# Patient Record
Sex: MALE | Race: BLACK OR AFRICAN AMERICAN | NOT HISPANIC OR LATINO | URBAN - METROPOLITAN AREA
[De-identification: names, ages, dates, MRNs, and addresses within clinical notes are randomized per-mention and may not be internally consistent; named-entity substitution may affect disease eponyms.]

---
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
## 2024-12-16 ENCOUNTER — TELEMEDICINE (OUTPATIENT)
Dept: BARIATRICS | Facility: CLINIC | Age: 24
End: 2024-12-16
Payer: COMMERCIAL

## 2024-12-16 VITALS
BODY MASS INDEX: 38.36 KG/M2 | SYSTOLIC BLOOD PRESSURE: 128 MMHG | WEIGHT: 315 LBS | HEIGHT: 76 IN | OXYGEN SATURATION: 97 % | HEART RATE: 88 BPM | DIASTOLIC BLOOD PRESSURE: 84 MMHG

## 2024-12-16 DIAGNOSIS — E66.01 OBESITY, CLASS III, BMI 40-49.9 (MORBID OBESITY) (HCC): Primary | ICD-10-CM

## 2024-12-16 PROBLEM — E66.813 OBESITY, CLASS III, BMI 40-49.9 (MORBID OBESITY): Status: ACTIVE | Noted: 2024-12-16

## 2024-12-16 PROCEDURE — 99204 OFFICE O/P NEW MOD 45 MIN: CPT | Performed by: SURGERY

## 2024-12-16 NOTE — PROGRESS NOTES
Virtual Regular Visit  Name: Vimal Solorio      : 2000      MRN: [id number]  Encounter Provider: Hu Nava MD  Encounter Date: 2024   Encounter department: [facility] WEIGHT MANAGEMENT [facility]      Verification of patient location:  Patient is located at the office in the following state in which I hold an active license [place] :  Assessment & Plan  Obesity, Class III, BMI 40-49.9 (morbid obesity) (Formerly [facility])               Encounter provider Hu Nava MD    The patient was identified by name and date of birth. Vimal Solorio was informed that this is a telemedicine visit and that the visit is being conducted through the Epic Embedded platform. He agrees to proceed..  My office door was closed. No one else was in the room.  He acknowledged consent and understanding of privacy and security of the video platform. The patient has agreed to participate and understands they can discontinue the visit at any time.    Patient is aware this is a billable service.     History of Present Illness     HPI:  Vimal Solorio is a 24 y.o. male who presents with a longstanding history of morbid obesity and inability to sustain a meaningful weight loss.    Here today to discuss bariatric options.    Visit type: initial visit    Symptoms: excess weight and inability to loss weight    Associated Symptoms: depressed mood and anxiety    Associated Conditions: abdominal obesity  Disease Complications: none  Weight Loss Interest: high  Previous Diet Trials: low calorie     Exercise Frequency:infrequency  Types of Exercise: walking    Review of Systems    Historical Information   No past medical history on file.  No past surgical history on file.  Social History   Social History     Substance and Sexual Activity   Alcohol Use Not on file     Social History     Substance and Sexual Activity   Drug Use Not on file     Social History     Tobacco Use   Smoking Status Not on file   Smokeless Tobacco Not on file     Family History:  "Family history non-contributory    Meds/Allergies   all medications and allergies reviewed  Not on File    Objective     Current Vitals:   Blood Pressure: 128/84 (12/16/24 0944)  Pulse: 88 (12/16/24 0944)  Height: 6' 3.5\" (191.8 cm) (12/16/24 0944)  Weight - Scale: (!) 239 kg (526 lb) (12/16/24 0944)  SpO2: 97 % (12/16/24 0944)      Invasive Devices       None                   Physical Exam    Lab Results: I have personally reviewed pertinent lab results.    Imaging: Results Review Statement: No pertinent imaging studies reviewed.  EKG, Pathology, and Other Studies: Results Review Statement: No pertinent imaging studies reviewed.      Assessment/PLAN:    24 y.o. male with a long standing h/o of obesity and inability to sustain any meaningful weight loss on his own despite several attempts.    He is interested in the Laparoscopic Sleeve Gastrectomy.  This is the surgery that he is interested in.    As a part of his pre op evaluation, he will be referred to a cardiologist for risk stratification and for a sleep evaluation and consult to rule out obstructive sleep apnea if deemed necessary based on his score.    He needs an EGD to evaluate the anatomy of his GI tract.    I have spent over 30 minutes with him face to face in the office today discussing his options and details of the surgery. We have seen an animation of the surgery on the computer that illustrates how the operation is done and how the anatomy will be altered with the procedure. Over 50% of this was coordinating care.    I have used the MBSAQIP bariatric surgical risk/benefit calculator and we have discussed the results as part of the preop education.  I have discussed and educated the patient with regards to the components of our multidisciplinary program and the importance of compliance and follow up in the post operative period.    He was given the opportunity to ask questions and I have answered all of them.    The patient was also instructed with " "regards to the importance of behavior modification, nutritional counseling, support meeting attendance and lifestyle changes that are important to ensure success.    Although there is a great statistical chance of improvement or even resolution of most of his associated comorbidities, the results vary from patient to patient and they largely depend on his commitment and compliance.     I have reviewed instructions for stopping or tapering anti-obesity medications prior to surgery.      I have encouraged him to lose 40-50 lbs prior to the operation as he currently weighs 526 pounds with a BMI of 64.8.  At his current weight we are not going to be able to offer him any surgical options until we help him lose      Hu Nava MD  12/16/2024  9:47 AM    Objective   /84 (BP Location: Right arm, Patient Position: Sitting, Cuff Size: Large)   Pulse 88   Ht 6' 3.5\" (1.918 m)   Wt (!) 239 kg (526 lb)   SpO2 97%   BMI 64.88 kg/m²     Physical Exam (Visual assessment)    Patient was awake alert and oriented.  No distress.  Obese.      Visit Time  Total Visit Duration: 25  "

## 2024-12-16 NOTE — PROGRESS NOTES
"    BARIATRIC INITIAL CONSULT - BARIATRIC SURGERY    Vimal Solorio 24 y.o. male MRN: [id number]  Unit/Bed#:  Encounter: [id number]      HPI:  Vimal Solorio is a 24 y.o. male who presents with a longstanding history of morbid obesity and inability to sustain a meaningful weight loss.    Here today to discuss bariatric options.    Visit type: initial visit    Symptoms: excess weight and inability to loss weight    Associated Symptoms: depressed mood and anxiety    Associated Conditions: abdominal obesity  Disease Complications: none  Weight Loss Interest: high  Previous Diet Trials: low calorie     Exercise Frequency:infrequency  Types of Exercise: walking    Review of Systems    Historical Information   No past medical history on file.  No past surgical history on file.  Social History   Social History     Substance and Sexual Activity   Alcohol Use Not on file     Social History     Substance and Sexual Activity   Drug Use Not on file     Social History     Tobacco Use   Smoking Status Not on file   Smokeless Tobacco Not on file     Family History: Family history non-contributory    Meds/Allergies   all medications and allergies reviewed  Not on File    Objective     Current Vitals:   Blood Pressure: 128/84 (12/16/24 0944)  Pulse: 88 (12/16/24 0944)  Height: 6' 3.5\" (191.8 cm) (12/16/24 0944)  Weight - Scale: (!) 239 kg (526 lb) (12/16/24 0944)  SpO2: 97 % (12/16/24 0944)      Invasive Devices       None                   Physical Exam    Lab Results: I have personally reviewed pertinent lab results.    Imaging: Results Review Statement: No pertinent imaging studies reviewed.  EKG, Pathology, and Other Studies: Results Review Statement: No pertinent imaging studies reviewed.      Assessment/PLAN:    24 y.o. male with a long standing h/o of obesity and inability to sustain any meaningful weight loss on his own despite several attempts.    He is interested in the Laparoscopic sleeve gastrectomy.  This this operation that he " is interested in.    As a part of his pre op evaluation, he will be referred to a cardiologist for risk stratification and for a sleep evaluation and consult to rule out obstructive sleep apnea if deemed necessary based on his score.    He needs an EGD to evaluate the anatomy of his GI tract.    I have spent over 30 minutes with him face to face in the office today discussing his options and details of the surgery. We have seen an animation of the surgery on the computer that illustrates how the operation is done and how the anatomy will be altered with the procedure. Over 50% of this was coordinating care.    I have used the [facility] bariatric surgical risk/benefit calculator and we have discussed the results as part of the preop education.  I have discussed and educated the patient with regards to the components of our multidisciplinary program and the importance of compliance and follow up in the post operative period.    He was given the opportunity to ask questions and I have answered all of them.    The patient was also instructed with regards to the importance of behavior modification, nutritional counseling, support meeting attendance and lifestyle changes that are important to ensure success.    Although there is a great statistical chance of improvement or even resolution of most of his associated comorbidities, the results vary from patient to patient and they largely depend on his commitment and compliance.     I have reviewed instructions for stopping or tapering anti-obesity medications prior to surgery.      I have encouraged him to lose 40-50 lbs prior to the operation.      Hu Nava MD  12/16/2024  9:47 AM

## 2025-01-09 ENCOUNTER — CLINICAL SUPPORT (OUTPATIENT)
Dept: BARIATRICS | Facility: CLINIC | Age: 25
End: 2025-01-09

## 2025-01-09 VITALS — BODY MASS INDEX: 38.36 KG/M2 | WEIGHT: 315 LBS | HEIGHT: 76 IN

## 2025-01-09 DIAGNOSIS — E66.01 OBESITY, CLASS III, BMI 40-49.9 (MORBID OBESITY) (HCC): Primary | ICD-10-CM

## 2025-01-09 PROCEDURE — RECHECK

## 2025-01-13 ENCOUNTER — OFFICE VISIT (OUTPATIENT)
Dept: FAMILY MEDICINE CLINIC | Facility: CLINIC | Age: 25
End: 2025-01-13
Payer: COMMERCIAL

## 2025-01-13 VITALS
SYSTOLIC BLOOD PRESSURE: 138 MMHG | BODY MASS INDEX: 38.36 KG/M2 | HEIGHT: 76 IN | RESPIRATION RATE: 18 BRPM | WEIGHT: 315 LBS | TEMPERATURE: 96.7 F | DIASTOLIC BLOOD PRESSURE: 82 MMHG | HEART RATE: 84 BPM

## 2025-01-13 DIAGNOSIS — Z00.00 ROUTINE ADULT HEALTH MAINTENANCE: Primary | ICD-10-CM

## 2025-01-13 DIAGNOSIS — Z11.59 NEED FOR HEPATITIS C SCREENING TEST: ICD-10-CM

## 2025-01-13 DIAGNOSIS — Z23 NEED FOR VACCINATION: ICD-10-CM

## 2025-01-13 DIAGNOSIS — Z13.6 SCREENING FOR CARDIOVASCULAR CONDITION: ICD-10-CM

## 2025-01-13 DIAGNOSIS — Z13.0 SCREENING FOR DEFICIENCY ANEMIA: ICD-10-CM

## 2025-01-13 DIAGNOSIS — Z13.1 SCREENING FOR DIABETES MELLITUS: ICD-10-CM

## 2025-01-13 DIAGNOSIS — Z13.29 SCREENING FOR THYROID DISORDER: ICD-10-CM

## 2025-01-13 PROCEDURE — 99385 PREV VISIT NEW AGE 18-39: CPT | Performed by: NURSE PRACTITIONER

## 2025-01-13 PROCEDURE — 90471 IMMUNIZATION ADMIN: CPT

## 2025-01-13 PROCEDURE — 90715 TDAP VACCINE 7 YRS/> IM: CPT

## 2025-01-13 NOTE — PATIENT INSTRUCTIONS
"Patient Education     Routine physical for adults   The Basics   Written by the doctors and editors at [facility]   What is a physical? -- A physical is a routine visit, or \"check-up,\" with your doctor. You might also hear it called a \"wellness visit\" or \"preventive visit.\"  During each visit, the doctor will:   Ask about your physical and mental health   Ask about your habits, behaviors, and lifestyle   Do an exam   Give you vaccines if needed   Talk to you about any medicines you take   Give advice about your health   Answer your questions  Getting regular check-ups is an important part of taking care of your health. It can help your doctor find and treat any problems you have. But it's also important for preventing health problems.  A routine physical is different from a \"sick visit.\" A sick visit is when you see a doctor because of a health concern or problem. Since physicals are scheduled ahead of time, you can think about what you want to ask the doctor.  How often should I get a physical? -- It depends on your age and health. In general, for people age 21 years and older:   If you are younger than 50 years, you might be able to get a physical every 3 years.   If you are 50 years or older, your doctor might recommend a physical every year.  If you have an ongoing health condition, like diabetes or high blood pressure, your doctor will probably want to see you more often.  What happens during a physical? -- In general, each visit will include:   Physical exam - The doctor or nurse will check your height, weight, heart rate, and blood pressure. They will also look at your eyes and ears. They will ask about how you are feeling and whether you have any symptoms that bother you.   Medicines - It's a good idea to bring a list of all the medicines you take to each doctor visit. Your doctor will talk to you about your medicines and answer any questions. Tell them if you are having any side effects that bother you. You " "should also tell them if you are having trouble paying for any of your medicines.   Habits and behaviors - This includes:   Your diet   Your exercise habits   Whether you smoke, drink alcohol, or use drugs   Whether you are sexually active   Whether you feel safe at home  Your doctor will talk to you about things you can do to improve your health and lower your risk of health problems. They will also offer help and support. For example, if you want to quit smoking, they can give you advice and might prescribe medicines. If you want to improve your diet or get more physical activity, they can help you with this, too.   Lab tests, if needed - The tests you get will depend on your age and situation. For example, your doctor might want to check your:   Cholesterol   Blood sugar   Iron level   Vaccines - The recommended vaccines will depend on your age, health, and what vaccines you already had. Vaccines are very important because they can prevent certain serious or deadly infections.   Discussion of screening - \"Screening\" means checking for diseases or other health problems before they cause symptoms. Your doctor can recommend screening based on your age, risk, and preferences. This might include tests to check for:   Cancer, such as breast, prostate, cervical, ovarian, colorectal, prostate, lung, or skin cancer   Sexually transmitted infections, such as chlamydia and gonorrhea   Mental health conditions like depression and anxiety  Your doctor will talk to you about the different types of screening tests. They can help you decide which screenings to have. They can also explain what the results might mean.   Answering questions - The physical is a good time to ask the doctor or nurse questions about your health. If needed, they can refer you to other doctors or specialists, too.  Adults older than 65 years often need other care, too. As you get older, your doctor will talk to you about:   How to prevent falling at " home   Hearing or vision tests   Memory testing   How to take your medicines safely   Making sure that you have the help and support you need at home  All topics are updated as new evidence becomes available and our peer review process is complete.  This topic retrieved from [employer] on: May 02, 2024.  Topic 502930 Version 1.0  Release: 32.4.3 - C32.122  © 2024 UpToDate, Inc. and/or its affiliates. All rights reserved.  Consumer Information Use and Disclaimer   Disclaimer: This generalized information is a limited summary of diagnosis, treatment, and/or medication information. It is not meant to be comprehensive and should be used as a tool to help the user understand and/or assess potential diagnostic and treatment options. It does NOT include all information about conditions, treatments, medications, side effects, or risks that may apply to a specific patient. It is not intended to be medical advice or a substitute for the medical advice, diagnosis, or treatment of a health care provider based on the health care provider's examination and assessment of a patient's specific and unique circumstances. Patients must speak with a health care provider for complete information about their health, medical questions, and treatment options, including any risks or benefits regarding use of medications. This information does not endorse any treatments or medications as safe, effective, or approved for treating a specific patient. UpToDate, Inc. and its affiliates disclaim any warranty or liability relating to this information or the use thereof.The use of this information is governed by the Terms of Use, available at https://www.woltersSendGriduwer.com/en/know/clinical-effectiveness-terms. 2024© UpToDate, Inc. and its affiliates and/or licensors. All rights reserved.  Copyright   © 2024 UpToDate, Inc. and/or its affiliates. All rights reserved.

## 2025-01-13 NOTE — PROGRESS NOTES
Adult Annual Physical  Name: Vimal Solorio      : 2000      MRN: [id number]  Encounter Provider: BRENDA Sena  Encounter Date: 2025   Encounter department: [facility]    Assessment & Plan  Routine adult health maintenance         Screening for diabetes mellitus    Orders:  •  Comprehensive metabolic panel; Future    Screening for deficiency anemia    Orders:  •  CBC; Future    Screening for cardiovascular condition    Orders:  •  Lipid Panel with Direct LDL reflex; Future    Need for hepatitis C screening test    Orders:  •  Hepatitis C antibody; Future    Screening for thyroid disorder    Orders:  •  TSH, 3rd generation with Free T4 reflex; Future    Need for vaccination    Orders:  •  TDAP VACCINE GREATER THAN OR EQUAL TO 8YO IM    Immunizations and preventive care screenings were discussed with patient today. Appropriate education was printed on patient's after visit summary.    Counseling:  Alcohol/drug use: discussed moderation in alcohol intake, the recommendations for healthy alcohol use, and avoidance of illicit drug use.  Dental Health: discussed importance of regular tooth brushing, flossing, and dental visits.  Injury prevention: discussed safety/seat belts, safety helmets, smoke detectors, carbon monoxide detectors, and smoking near bedding or upholstery.  Sexual health: discussed sexually transmitted diseases, partner selection, use of condoms, avoidance of unintended pregnancy, and contraceptive alternatives.  Exercise: the importance of regular exercise/physical activity was discussed. Recommend exercise 3-5 times per week for at least 30 minutes.          History of Present Illness     Adult Annual Physical:  Patient presents for annual physical. New to practice.  Personal and family medical history reviewed  Patient stated that seeing weight management as trying to do gastric sleeve for weight loss  Denies any concerns and complains  .     Diet and Physical Activity:  -  "Diet/Nutrition:. trying to eat healthy  - Exercise: 1-2 times a week on average.    Depression Screening:  - PHQ-2 Score: 0    General Health:  - Sleep: sleeps well.  - Hearing: normal hearing bilateral ears.  - Vision:. recommended to see opthalmology  - Dental: no dental visits for > 1 year.     Health:    - Urinary symptoms: none.     Review of Systems   Constitutional: Negative.    HENT: Negative.     Eyes: Negative.    Respiratory: Negative.     Cardiovascular: Negative.    Gastrointestinal: Negative.    Endocrine: Negative.    Genitourinary: Negative.    Musculoskeletal: Negative.    Skin: Negative.    Allergic/Immunologic: Negative.    Neurological: Negative.    Hematological: Negative.    Psychiatric/Behavioral: Negative.           Objective   /82   Pulse 84   Temp (!) 96.7 °F (35.9 °C)   Resp 18   Ht 6' 3.5\" (1.918 m)   Wt (!) 233 kg (513 lb 3.2 oz)   BMI 63.30 kg/m²   Vision Screening    Right eye Left eye Both eyes   Without correction 20/25 20/25 20/25   With correction         Physical Exam  Vitals and nursing note reviewed.   Constitutional:       Appearance: He is morbidly obese.   HENT:      Head: Normocephalic and atraumatic.      Salivary Glands: Right salivary gland is not diffusely enlarged or tender. Left salivary gland is not diffusely enlarged or tender.      Right Ear: Tympanic membrane, ear canal and external ear normal.      Left Ear: Tympanic membrane, ear canal and external ear normal.      Nose: Nose normal. No nasal tenderness or mucosal edema.      Right Sinus: No maxillary sinus tenderness or frontal sinus tenderness.      Left Sinus: No maxillary sinus tenderness or frontal sinus tenderness.      Mouth/Throat:      Lips: Pink.      Mouth: Mucous membranes are moist.      Dentition: Normal dentition.      Tongue: No lesions.      Pharynx: No pharyngeal swelling, oropharyngeal exudate, posterior oropharyngeal erythema or uvula swelling.   Eyes:      General: Lids are " normal.         Right eye: No hordeolum.         Left eye: No hordeolum.      Conjunctiva/sclera: Conjunctivae normal.   Neck:      Thyroid: No thyromegaly or thyroid tenderness.   Cardiovascular:      Rate and Rhythm: Normal rate and regular rhythm.      Pulses: Normal pulses.      Heart sounds: No murmur heard.  Pulmonary:      Effort: Pulmonary effort is normal. No respiratory distress.      Breath sounds: Normal breath sounds.   Chest:      Chest wall: No mass, lacerations, deformity, swelling, tenderness, crepitus or edema. There is no dullness to percussion.   Breasts:     Right: No swelling, bleeding, inverted nipple, mass, nipple discharge, skin change or tenderness.      Left: No swelling, bleeding, inverted nipple, mass, nipple discharge, skin change or tenderness.   Abdominal:      General: Abdomen is flat.      Palpations: Abdomen is soft.      Tenderness: There is no abdominal tenderness.      Hernia: No hernia is present. There is no hernia in the umbilical area or ventral area.   Musculoskeletal:         General: Normal range of motion.      Cervical back: Full passive range of motion without pain and neck supple.      Right lower leg: No edema.      Left lower leg: No edema.   Lymphadenopathy:      Cervical:      Right cervical: No superficial or posterior cervical adenopathy.     Left cervical: No superficial or posterior cervical adenopathy.      Upper Body:      Right upper body: No supraclavicular or axillary adenopathy.      Left upper body: No supraclavicular or axillary adenopathy.      Lower Body: No right inguinal adenopathy. No left inguinal adenopathy.   Skin:     General: Skin is warm and dry.   Neurological:      General: No focal deficit present.      Mental Status: He is alert.      GCS: GCS eye subscore is 4. GCS verbal subscore is 5. GCS motor subscore is 6.      Sensory: Sensation is intact.      Motor: Motor function is intact.      Coordination: Coordination is intact.      Gait:  Gait is intact.      Deep Tendon Reflexes: Reflexes are normal and symmetric.

## 2025-01-15 ENCOUNTER — CLINICAL SUPPORT (OUTPATIENT)
Dept: BARIATRICS | Facility: CLINIC | Age: 25
End: 2025-01-15

## 2025-01-15 VITALS — WEIGHT: 315 LBS | BODY MASS INDEX: 62.78 KG/M2

## 2025-01-15 VITALS — HEIGHT: 76 IN | BODY MASS INDEX: 38.36 KG/M2 | WEIGHT: 315 LBS

## 2025-01-15 DIAGNOSIS — E66.01 MORBID (SEVERE) OBESITY DUE TO EXCESS CALORIES (HCC): ICD-10-CM

## 2025-01-15 DIAGNOSIS — E66.01 OBESITY, CLASS III, BMI 40-49.9 (MORBID OBESITY) (HCC): Primary | ICD-10-CM

## 2025-01-15 DIAGNOSIS — Z01.818 PRE-OP TESTING: Primary | ICD-10-CM

## 2025-01-15 PROCEDURE — RECHECK

## 2025-01-15 NOTE — PROGRESS NOTES
Bariatric Behavioral Health Evaluation    Presenting Problem:  Vimal Solorio  is a 24 y.o.   male   :  2000   Patient is pursuing surgery to improve his health, to have long term health. He is hoping the surgery will promote improved dietary intake, to be more active and to reduce physical limitations. Patient has tried increasing exercise, be conscientious of food choices, restrict meals to once a day.     Is the patient seeking Bariatric Surgery Eval?  Yes   If yes, how long has patient been considering, researching and/or making changes for surgery? Patient has been considering the surgery about a year, his girlfriend has had the surgery and spoke with her about her experience. He feels he's getting older so now is the time to pursure surgery.     Realizes Post-Op Requirements? Yes      Pre-morbid level of function and history of present illness: No chronic health conditions or pain, some mobility limitations and shortness of breath.     Living situation: Patient lives with mother. Good living environment, no conflict or tension, good support. He's close with his brothers and father, good family support.    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 Patient works  through  11pm to 7am. He likes his work, he's been there for four years, no real stress, good support with coworkers.     Physical Activity: Patient was going for walks during warmer days but with the cold that has stopped. Most of his activity is with work, he plans to incorporate weight lifting at home.    Family History (medical, traditions, culture, rules/routines around food):   Obesity/Overweight: brother  Mental Health Diagnosis: none  Substance Use Disorder/Alcoholism: none  Tobacco use: father  Family Cultural/Traditions: Growing up he was able to choose what he wanted to eat, he always enjoyed his  mother's cooking. Food was plated for him, was able to get seconds during meals, had to ask permission for snacks. No food insecurity.    Mental Health, Trauma and Substance use Assessment    Psychiatric/Psychological Treatment Diagnosis, History of Eating Disorders: Patient denies any mental health or eating disorder diagnosis     Outpatient Counselor No      Psychiatrist No     Have you had any Mental Health or Substance Use Inpatient Treatment? No    Drug and/or Alcohol use and treatment history: Patient denies any substance use disorder, he drinks alcohol on social occasions and holidays, up to 1 beer at an event.     Tobacco/Vaping History: he is a non-smoker and doesn't vape    Domestic Violence: No      Abuse or Trauma History: None      Risk Assessment    Stressors and Supports: Patient struggles with weight and the stress it puts on their health, no other life stressors. Patient's parents, girlfriend, and brothers know he's seeking surgery and they are supportive.    Risk of harm to self or others: Patient denies any SI/HI.    Presence of Audio/Visual Hallucinations:  No audio or visual hallucinations reported.    Access to weapons: none     Observation: this interview    Based on the previous information, the client presents the following risk of harm to self or others: low      Physical/Mental Health Status:     Appearance: appropriate  Sociability: friendly  Affect: appropriate  Mood: calm  Thought Process: coherent  Speech: normal  Content: no impairment  Orientation: person  Yes , place  Yes , time  Yes , normal attention span  Yes , normal memory  Yes  , decreased in concentration ability  No, and normal judgement  Yes   Insight: emotional  good       BARIATRIC SURGERY EDUCATION CHECKLIST    Patient has received the following education related to the bariatric surgery process and understands:    Patients may be required to complete a psychiatric evaluation and receive clearance for surgery from [facility]  health provider.    Patients who undergo weight loss surgery are at higher risk of increased mental health concerns and suicide attempts.    Patients may be required to complete a full substance abuse evaluation and then complete all treatment recommendations prior to surgery.    If diagnosis of abuse/dependence results, patient may be required to remain sober for one (1) year before having bariatric surgery.    Patients on psychiatric medications should check with their provider to discuss psychiatric medications and the changes in absorption.  Patient should discuss all time release medications with provider and take all medications as prescribed.    The recommendation is that there is no use of any tobacco products, Hookah or vapes for the bariatric post-operation patient.    Bariatric surgery patients should not consume alcohol as a post-operative patient as it may increase risk of numerous health conditions including but not limited to alcohol abuse and ulcers.    There is a possibility of weight regain if patient does not follow all program guidelines and recommendations.    Bariatric surgery patients should exercise thirty (30) to sixty (60) minutes per day to maintain post-surgical weight loss.    Research indicates that bariatric patients are more successful when they see a therapist for up to two (2) years post-op.    Patients will follow all medical and dietary recommendations provided.    Patient will keep all scheduled appointments and follow up with their physician for a minimum of five (5) years.    Patient will take all vitamins as recommended.  Post-operative vitamins are life-long.    There is a goal month set.  All requirements should be met by this time. Don't wait to get started!    There is a deadline month set.  All requirements must be finished by this time and if not, the patient will be halted in the surgery process. The patient can be referred to the medical weight management program or  can come back to the surgical program once the unfinished tasks from the previous program are completed.      Female patients of childbearing years are informed that pregnancy is not recommended until 12 months post-op.      Recommendations: Recommended for surgery  yes and Patient meets the criteria to be a member of [facility] Bariatric surgery program.      Note:  Patient denies any mental health diagnosis or eating disorder, she denies any substance use disorders or smoking/vaping of any kind. He drinks alcohol on social occasions, 1 beer during an event. Risk of alcohol, tobacco use, smoking or vaping of any kind post op were discussed, recommendation to abstain from all substance use was explained. Patient works non-traditional hours so sleeping and eating routine may be off. He used to snack while playing video games, skip meals. He is now trying to spread out his calories throughout the day, getting about six hours of sleep, no snacking, drinking more water. May rely on quick carbs like a bowl of cereal as a meal. Encouraged patient to be mindful of delaying meals, spreading calories out throughout his waking hours as he may not eat at all while at work, tune in to any snacking. No contraindications for surgery are identified at this time, patient is aware of post-op risks, it is recommended for patient to progress through surgical process.  Catie Rae LCSW, Bariatric

## 2025-01-15 NOTE — PROGRESS NOTES
"Bariatric Nutrition Assessment Note    Type of surgery    Preop (12 wt check)  Surgery Date: TBD--pt is leaning more toward the RYGB  Surgeon: Daryl Surgeons: Dr. Nava     Nutrition Assessment   Vimal Solorio  24 y.o.  male     Wt with BMI of 25: 203.2#  Pre-Op Excess Wt: 323#  Ht 6' 3.5\" (1.918 m)   Wt (!) 231 kg (509 lb)   BMI 62.78 kg/m²     Start weight: 526# (1216/24)    [place] Jeor Equation:    [id number]  Weight Maintenance 4083  Estimated calories for weight loss 2600-4483 ( 1-2# per wk wt loss - sedentary )  Estimated protein needs 92-138g (1.0-1.5 gms/kg IBW )   Estimated fluid needs 3220 ml(30-35 ml/kg IBW )      Weight History   Onset of Obesity: Childhood--when was in HS and wasn't planing sports and now poor eating schedule  Family history of obesity: No  Wt Loss Attempts: Exercise  Self Created Diets (Portion Control, Healthy Food Choices, etc.)  Juicing  Patient has tried the above for 6 months or more with insufficient weight loss or weight regain, which is why patient has requested to be evaluated for weight loss surgery today  Maximum Wt Lost: -20#      Review of History and Medications   Past Medical History:   Diagnosis Date    No pertinent past medical history      Past Surgical History:   Procedure Laterality Date    WISDOM TOOTH EXTRACTION Bilateral     [id number]     Social History     Socioeconomic History    Marital status: Single     Spouse name: Not on file    Number of children: Not on file    Years of education: Not on file    Highest education level: Not on file   Occupational History    Not on file   Tobacco Use    Smoking status: Never    Smokeless tobacco: Never   Vaping Use    Vaping status: Never Used   Substance and Sexual Activity    Alcohol use: Not Currently    Drug use: Not on file    Sexual activity: Not on file   Other Topics Concern    Not on file   Social History Narrative    Not on file     Social Drivers of Health     Financial Resource Strain: Not on file "   Food Insecurity: Not on file   Transportation Needs: Not on file   Physical Activity: Not on file   Stress: Not on file   Social Connections: Not on file   Intimate Partner Violence: Not on file   Housing Stability: Not on file     No current outpatient medications on file.    Food Intake and Lifestyle Assessment   Food Intake Assessment completed via usual diet recall  Works 11p to 7am  Breakfast: 7:30-8am: bowl of cereal (cheerios) 1.5-2 cups with almond milk  Plays video games/watch movie  Sleeps from 6pm until about 10pm  3pm: smoothie--strawberries, banana, spinach w/water  7:30-8pm--will wake up to eat then back to sleep:  pan cooked 6oz chicken, a lot green beans and 3/4 cup rice. Before made changes was doing more pasta, larger portions, pizza  Not eating work overnight.    Beverage intake: water, juice, regular soda, and alcohol  Protein supplement: not at this time  Estimated protein intake per day: <75gm  Estimated fluid intake per day: 80oz  Meals eaten away from home: no more take out, a lot more home cooked  Typical meal pattern: 2 meals per day and little snacks per day  Eating Behaviors: Consumption of high calorie/ high fat foods, Consumption of high calorie beverages, and Large portion sizes  Food allergies or intolerances: No Known Allergies  Cultural or Christian considerations: -    Physical Assessment  Physical Activity  Types of exercise: more walking during the summer, wants to get back to the gym  Current physical limitations: -    Psychosocial Assessment   Support systems: significant other friend(s) relative(s)--girlfriend had sleeve sx  Socioeconomic factors: mom does most of the cooking and food shopping, but eats at girlfriend's more often who had sx.     Nutrition Diagnosis  Diagnosis: Overweight / Obesity (NC-3.3)  Related to: Physical inactivity and Excessive energy intake  As Evidenced by: BMI >25     Nutrition Prescription: Recommend the following diet  Regular    Interventions  "and Teaching   Discussed pre-op and post-op nutrition guidelines.       Patient educated and handouts provided.  Surgical changes to stomach / GI  Capacity of post-surgery stomach  Diet progression  Adequate hydration  Sugar and fat restriction to decrease \"dumping syndrome\"  Fat restriction to decrease steatorrhea  Expected weight loss  Weight loss plateaus/ possibility of weight regain  Exercise  Suggestions for pre-op diet  Nutrition considerations after surgery  Protein supplements  Meal planning and preparation  Appropriate carbohydrate, protein, and fat intake, and food/fluid choices to maximize safe weight loss, nutrient intake, and tolerance   Dietary and lifestyle changes  Possible problems with poor eating habits  Intuitive eating  Techniques for self monitoring and keeping daily food journal  Potential for food intolerance after surgery, and ways to deal with them including: lactose intolerance, nausea, reflux, vomiting, diarrhea, food intolerance, appetite changes, gas  Vitamin / Mineral supplementation of Multivitamin with minerals and Vitamin D      Education provided to: patient    Barriers to learning: No barriers identified    Readiness to change: preparation    Prior research on procedure: books, internet, discussed with provider, and friends or family    Comprehension: verbalizes understanding     Expected Compliance: good    Recommendations  Pt is an appropriate candidate for surgery. Yes    Evaluation / Monitoring  Dietitian to Monitor: Eating pattern as discussed Tolerance of nutrition prescription Body weight Lab values Physical activity    Pre-op weight goals:  Do NOT Gain  Can go to BMI of 40:   325#  Encouraged weight loss w/ diet and lifestyle changes  Will be started on a 2 week liver shrinking diet, possibly shorter/longer as per the discretion of the surgeon/team, directly prior to surgery    Workflow: (Incomplete in Bold):  Psych and/or D+A Clearance: n/a  Blood Work: has blood work " ordered from PCP, added A1c and advised to complete soon  PCP letter: completed  Surgeon Appt: completed--nan on 12/16/24  EGD: date TBD  Cardiac Risk Assessment with ECG: scheduled on 2/19/25  Sleep Studies: scheduled on 3/24/25  Nicotine test: n/a  Pre-Operative Program: 12 wt checks--all scheduled    Goals  Eliminate sugar sweetened beverages  64oz + calorie free fluids  Food journal via baritastic  Exercise 30 minutes 5 times per week--wants to get into the gym  Complete lesson plans 1-6  Eat 3 meals per day with protein at each meal  Use protein shake as meal replacement or snack as needed  Eliminate mindless snacking    Time Spent:   1 Hour

## 2025-01-21 ENCOUNTER — TELEPHONE (OUTPATIENT)
Dept: BARIATRICS | Facility: CLINIC | Age: 25
End: 2025-01-21

## 2025-01-23 ENCOUNTER — CLINICAL SUPPORT (OUTPATIENT)
Dept: BARIATRICS | Facility: CLINIC | Age: 25
End: 2025-01-23

## 2025-01-23 VITALS — BODY MASS INDEX: 62.29 KG/M2 | WEIGHT: 315 LBS

## 2025-01-23 DIAGNOSIS — E66.01 MORBID (SEVERE) OBESITY DUE TO EXCESS CALORIES (HCC): Primary | ICD-10-CM

## 2025-01-23 PROCEDURE — RECHECK

## 2025-01-23 NOTE — PROGRESS NOTES
Patient presents for 2 of 12 weight check, current weight 505lbs.    Eating behaviors/food choices: Patient reports increasing meals and water intake. He is focusing on three meals a day, taking a protein shake with him to work, snacking on fruit or other proteins if he is feeling hungry. He is trying to avoid tempting foods, mom made homemade ice cream that he did enjoy some of but left the room when he became to tempted. Discussed the balance of healthy foods with those occasional treats, being mindful of what he's choosing and why, eating mindfully. Patient is doing well with his water intake that is keeping him full, he reports improved energy with hydration, going to try different water flavorings and fruit infusion.    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 He plans to start going to the gym with a friend, he is looking forward to that interaction.    Sleep/Rest:  Patient reports he is doing better with sleep, instead of playing video games when he gets home from work he's going right to sleep. He's getting about 6hrs of sleep, reviewed the benefit of sleep on weight and health.      Mental Health/Wellness:  Patient is feeling good about the changes he's made and the weight loss he's attaining. He feels the changes are ones that he can continue, getting in water all the time is a hard change but he is hopeful to keep it going with water flavorings. He denies any mood changes or stressors, he is hopeful for continued weight management success.    Workflow review:    Labs and PCP: planning to go for labs on 1/25, needs PCP letter  Psych and EGD: no eval needed, schedule EGD  Nicotine: NA  Cardiology: scheduled 2/19  Sleep: scheduled 3/24  Weight Checks: 12    Goals:    - continue focus on protein rich foods and getting hydration  - continue to get adequate rest, incorporate activity as tolerated  - be mindful of food choices and  triggers of eating    Next Appointment:  weight check 3 of 12 with RD on 1/27

## 2025-01-25 LAB
ALBUMIN SERPL-MCNC: 4.5 G/DL (ref 4.3–5.2)
ALP SERPL-CCNC: 121 IU/L (ref 44–121)
ALT SERPL-CCNC: 32 IU/L (ref 0–44)
AST SERPL-CCNC: 24 IU/L (ref 0–40)
BASOPHILS # BLD AUTO: 0 X10E3/UL (ref 0–0.2)
BASOPHILS NFR BLD AUTO: 1 %
BILIRUB SERPL-MCNC: 0.3 MG/DL (ref 0–1.2)
BUN SERPL-MCNC: 11 MG/DL (ref 6–20)
BUN/CREAT SERPL: 11 (ref 9–20)
CALCIUM SERPL-MCNC: 9.7 MG/DL (ref 8.7–10.2)
CHLORIDE SERPL-SCNC: 104 MMOL/L (ref 96–106)
CHOLEST SERPL-MCNC: 146 MG/DL (ref 100–199)
CO2 SERPL-SCNC: 20 MMOL/L (ref 20–29)
CREAT SERPL-MCNC: 1.01 MG/DL (ref 0.76–1.27)
EGFR: 108 ML/MIN/1.73
EOSINOPHIL # BLD AUTO: 0 X10E3/UL (ref 0–0.4)
EOSINOPHIL NFR BLD AUTO: 1 %
ERYTHROCYTE [DISTWIDTH] IN BLOOD BY AUTOMATED COUNT: 13.9 % (ref 11.6–15.4)
GLOBULIN SER-MCNC: 3.3 G/DL (ref 1.5–4.5)
GLUCOSE SERPL-MCNC: 101 MG/DL (ref 70–99)
HBA1C MFR BLD: 6.1 % (ref 4.8–5.6)
HCT VFR BLD AUTO: 46.1 % (ref 37.5–51)
HCV AB S/CO SERPL IA: NON REACTIVE
HDLC SERPL-MCNC: 42 MG/DL
HGB BLD-MCNC: 15.1 G/DL (ref 13–17.7)
IMM GRANULOCYTES # BLD: 0 X10E3/UL (ref 0–0.1)
IMM GRANULOCYTES NFR BLD: 0 %
LDLC SERPL CALC-MCNC: 87 MG/DL (ref 0–99)
LDLC/HDLC SERPL: 2.1 RATIO (ref 0–3.6)
LYMPHOCYTES # BLD AUTO: 1.4 X10E3/UL (ref 0.7–3.1)
LYMPHOCYTES NFR BLD AUTO: 29 %
MCH RBC QN AUTO: 26.4 PG (ref 26.6–33)
MCHC RBC AUTO-ENTMCNC: 32.8 G/DL (ref 31.5–35.7)
MCV RBC AUTO: 81 FL (ref 79–97)
MICRODELETION SYND BLD/T FISH: NORMAL
MONOCYTES # BLD AUTO: 0.3 X10E3/UL (ref 0.1–0.9)
MONOCYTES NFR BLD AUTO: 5 %
NEUTROPHILS # BLD AUTO: 3.2 X10E3/UL (ref 1.4–7)
NEUTROPHILS NFR BLD AUTO: 64 %
PLATELET # BLD AUTO: 355 X10E3/UL (ref 150–450)
POTASSIUM SERPL-SCNC: 4.4 MMOL/L (ref 3.5–5.2)
PROT SERPL-MCNC: 7.8 G/DL (ref 6–8.5)
RBC # BLD AUTO: 5.71 X10E6/UL (ref 4.14–5.8)
SL AMB VLDL CHOLESTEROL CALC: 17 MG/DL (ref 5–40)
SODIUM SERPL-SCNC: 140 MMOL/L (ref 134–144)
TRIGL SERPL-MCNC: 89 MG/DL (ref 0–149)
TSH SERPL DL<=0.005 MIU/L-ACNC: 1.37 UIU/ML (ref 0.45–4.5)
WBC # BLD AUTO: 5 X10E3/UL (ref 3.4–10.8)

## 2025-01-27 ENCOUNTER — RESULTS FOLLOW-UP (OUTPATIENT)
Dept: FAMILY MEDICINE CLINIC | Facility: CLINIC | Age: 25
End: 2025-01-27

## 2025-01-27 ENCOUNTER — CLINICAL SUPPORT (OUTPATIENT)
Dept: BARIATRICS | Facility: CLINIC | Age: 25
End: 2025-01-27

## 2025-01-27 VITALS — HEIGHT: 76 IN | BODY MASS INDEX: 38.36 KG/M2 | WEIGHT: 315 LBS

## 2025-01-27 DIAGNOSIS — E66.01 MORBID (SEVERE) OBESITY DUE TO EXCESS CALORIES (HCC): Primary | ICD-10-CM

## 2025-01-27 PROCEDURE — RECHECK

## 2025-01-27 NOTE — PROGRESS NOTES
"Bariatric Nutrition F/U Note    Type of surgery    Preop (12 wt check)--3 of 12 wt check  Surgery Date: TBD--pt is leaning more toward the RYGB  Surgeon: Daryl Surgeons: Dr. Nava     Nutrition Assessment   Vimal Solorio  24 y.o.  male     Wt with BMI of 25: 203.2#  Pre-Op Excess Wt: 323#  Ht 6' 3.5\" (1.918 m)   Wt (!) 230 kg (506 lb 12.8 oz)   BMI 62.51 kg/m²     Start weight: 526# (1216/24)  Net weight change: -19.2#    [place] Jeor Equation:    [id number]  Weight Maintenance 4083  Estimated calories for weight loss 2297-8899 ( 1-2# per wk wt loss - sedentary )  Estimated protein needs 92-138g (1.0-1.5 gms/kg IBW )   Estimated fluid needs 3220 ml(30-35 ml/kg IBW )      Weight History   Onset of Obesity: Childhood--when was in HS and wasn't planing sports and now poor eating schedule  Family history of obesity: No  Wt Loss Attempts: Exercise  Self Created Diets (Portion Control, Healthy Food Choices, etc.)  Juicing  Patient has tried the above for 6 months or more with insufficient weight loss or weight regain, which is why patient has requested to be evaluated for weight loss surgery today  Maximum Wt Lost: -20#    Review of History and Medications   Past Medical History:   Diagnosis Date    No pertinent past medical history      Past Surgical History:   Procedure Laterality Date    WISDOM TOOTH EXTRACTION Bilateral     [id number]     Social History     Socioeconomic History    Marital status: Single     Spouse name: Not on file    Number of children: Not on file    Years of education: Not on file    Highest education level: Not on file   Occupational History    Not on file   Tobacco Use    Smoking status: Never    Smokeless tobacco: Never   Vaping Use    Vaping status: Never Used   Substance and Sexual Activity    Alcohol use: Not Currently    Drug use: Not on file    Sexual activity: Not on file   Other Topics Concern    Not on file   Social History Narrative    Not on file     Social Drivers of Health " "    Financial Resource Strain: Not on file   Food Insecurity: Not on file   Transportation Needs: Not on file   Physical Activity: Not on file   Stress: Not on file   Social Connections: Not on file   Intimate Partner Violence: Not on file   Housing Stability: Not on file     No current outpatient medications on file.    Food Intake and Lifestyle Assessment   Food Intake Assessment completed via usual diet recall  Works 11p to 7am  Breakfast: 8am: now having a protein shake (premier) w/a bowl of cereal w/regular almond milk (honey bunches of oats)--using a smaller bowl that he eats out of since girlfriend at the sleeve sx and just bought protein cheerios that he is going to try.   Plays video games/watch movie  L: grilled chicken salad, boiled eggs, cheese w/Urdu dressing (measures 1-2 tbsp)  Sleeps from 6pm until about 10pm  3pm: greek yogurt )([employer] whole milk Greek 2/3 cup = 200 cals, 7gm protein---suggested Great value vanilla non-fat greek for 100 cals, 14g protein) + pineapple or banana   7:30-8pm--will wake up to eat then back to sleep:  pan cooked 6oz chicken, a lot green beans/broccoli and 3/4 cup rice.  Not eating work overnight.    \"Days off\" Sunday and Monday  Wakes up later between 9-10am   Similar food choices/meals.     Beverage intake: water, juice, regular soda, and alcohol  Protein supplement: not at this time  Estimated protein intake per day: <75gm  Estimated fluid intake per day: 80oz water, no more juice or soda  Meals eaten away from home: no more take out, a lot more home cooked  Typical meal pattern: 2 meals per day and little snacks per day  Eating Behaviors: Consumption of high calorie/ high fat foods, Consumption of high calorie beverages, and Large portion sizes  Food allergies or intolerances: No Known Allergies  Cultural or Yazidi considerations: -    Physical Assessment  Physical Activity  Types of exercise: more walking during the summer, wants to get back to the gym--plan " "is to start on Sunday.  Current physical limitations: -    Psychosocial Assessment   Support systems: significant other friend(s) relative(s)--girlfriend had sleeve sx  Socioeconomic factors: mom does most of the cooking and food shopping, but eats at girlfriend's more often who had sx.     Nutrition Diagnosis  Diagnosis: Overweight / Obesity (NC-3.3)  Related to: Physical inactivity and Excessive energy intake  As Evidenced by: BMI >25     Nutrition Prescription: Recommend the following diet  Regular    Interventions and Teaching   Discussed pre-op and post-op nutrition guidelines.       Patient educated and handouts provided.  Surgical changes to stomach / GI  Capacity of post-surgery stomach  Diet progression  Adequate hydration  Sugar and fat restriction to decrease \"dumping syndrome\"  Fat restriction to decrease steatorrhea  Expected weight loss  Weight loss plateaus/ possibility of weight regain  Exercise  Suggestions for pre-op diet  Nutrition considerations after surgery  Protein supplements  Meal planning and preparation  Appropriate carbohydrate, protein, and fat intake, and food/fluid choices to maximize safe weight loss, nutrient intake, and tolerance   Dietary and lifestyle changes  Possible problems with poor eating habits  Intuitive eating  Techniques for self monitoring and keeping daily food journal  Potential for food intolerance after surgery, and ways to deal with them including: lactose intolerance, nausea, reflux, vomiting, diarrhea, food intolerance, appetite changes, gas  Vitamin / Mineral supplementation of Multivitamin with minerals and Vitamin D      Education provided to: patient    Barriers to learning: No barriers identified    Readiness to change: preparation    Prior research on procedure: books, internet, discussed with provider, and friends or family    Comprehension: verbalizes understanding     Expected Compliance: good    Recommendations  Pt is an appropriate candidate for " surgery. Yes    Evaluation / Monitoring  Dietitian to Monitor: Eating pattern as discussed Tolerance of nutrition prescription Body weight Lab values Physical activity    Pre-op weight goals:  Do NOT Gain  Can go to BMI of 40:   325#  Encouraged weight loss w/ diet and lifestyle changes  Will be started on a 2 week liver shrinking diet, possibly shorter/longer as per the discretion of the surgeon/team, directly prior to surgery    Workflow: (Incomplete in Bold):  Psych and/or D+A Clearance: n/a  Blood Work: completed 1/24/25--acceptable  PCP letter: completed  Surgeon Appt: completed--nan on 12/16/24  EGD: date TBD  Cardiac Risk Assessment with ECG: scheduled on 2/19/25  Sleep Studies: scheduled on 3/24/25  Nicotine test: n/a  Pre-Operative Program: 12 wt checks--3 of 12 wt check    Pt gained about 1.9# since Thursday's visit. It was over the weekend.  He also He reports that he was a little stressed this morning because his dad wasn't calling anyone back so he had to rush over there. Thankfully everything was ok, but it did stress him out.  Reviewed diet recall and made a few suggested changes to decrease calories but increase protein. Pt plans on getting back to the gym Sunday. Will f/u in 1 weeks.     Goals  continue to avoid sugar sweetened beverages  64oz + calorie free fluids  Food journal via baritastic  Start back to the gym on Sunday  Complete lesson plans 1-6  Eat 3 meals per day with protein at each meal  Use protein shake as meal replacement or snack as needed  Make some food changes and discussed to decrease cals but increase protein  Eliminate mindless snacking    Time Spent:   30 mins

## 2025-01-29 ENCOUNTER — TELEPHONE (OUTPATIENT)
Age: 25
End: 2025-01-29

## 2025-01-29 DIAGNOSIS — E66.01 MORBID OBESITY (HCC): Primary | ICD-10-CM

## 2025-01-29 NOTE — TELEPHONE ENCOUNTER
Lety from  [facility] called to say the pt has an appt with them on 25 and needs an insurance referral. The doctor referral in his chart was given by weight mgt but is has to come from the pcp.  Lety states the insurance web site has a different  for the pt. I called the pt and confirm we have the correct  of 2000. He thought is was already corrected with [employer] but he will call them again. 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

## 2025-01-31 ENCOUNTER — TELEPHONE (OUTPATIENT)
Age: 25
End: 2025-01-31

## 2025-01-31 NOTE — TELEPHONE ENCOUNTER
Requesting Insurance Referral for Dr to Dr office visits NOT SLEEP STUDY. Needs to authorize 6 visits    Patient is requesting an insurance referral for the following specialty: Sleep Medicine         DX Code: E66.01    Date Of Service: 02/04 AND 5 ADDITIONAL VISITS    Location/Facility Name/Address/Phone #:   [facility] [address], [address]      Phone [phone]   Fax      [phone]    Location / Facility NPI: [id number]

## 2025-02-03 ENCOUNTER — CLINICAL SUPPORT (OUTPATIENT)
Dept: BARIATRICS | Facility: CLINIC | Age: 25
End: 2025-02-03

## 2025-02-03 VITALS — WEIGHT: 315 LBS | BODY MASS INDEX: 62.04 KG/M2

## 2025-02-03 DIAGNOSIS — E66.01 MORBID (SEVERE) OBESITY DUE TO EXCESS CALORIES (HCC): Primary | ICD-10-CM

## 2025-02-03 PROCEDURE — RECHECK

## 2025-02-03 NOTE — PROGRESS NOTES
Patient presents for 4 of 12 weight check, current weight 503lbs.    Eating behaviors/food choices: Patient continues mindful eating regiment, getting in three meals a day with planned out snacks. He's taking fruit and protein shake with him to his overnight work shift, having protein cereal when he gets home. Denies any snacking while playing video games, enjoying flavored seltzer and getting plenty of water in his Stanely cup.    Activity/Exercise:  Patient started at this gym this past Sunday and he really enjoyed it. He plans to get to the gym Sunday, Monday and Tuesdays and focusing on different body parts each day, allowing for muscle recovery.    Sleep/Rest:  Patient continues efforts to get adequate sleep given his non traditional working hours. He reports to be getting about 6hrs, no sleep issues reported. His sleep med appointment was moved up despite some insurance auth delays.    Mental Health/Wellness:  Patient denies any mood changes or significant stress. He was a little worried about his dad when he hadn't heard from him for a few days but managed it well with the help of his family. He was joking with his family on another occasion about body image, he feels he has a very positive one for himself. He is seeking out surgery for his health rather than what his body looks like. Discussed the impact of rapid weight loss with surgery can have, the potential for loose skin, encouraged to keep a positive mindset throughout his process.    Workflow review:    Labs and PCP: both done  Psych and EGD: no eval needed, EGD scheduled 2/21  Nicotine: NA  Cardiology: scheduled 2/19  Sleep: scheduled 2/4  Weight Checks: 12    Goals:    - continue structured, mindful eating  - continue efforts to be active  - continue to reflect on food choices, mood and mental health, managing overall wellness without use of food to soothe.    Next Appointment:  weight check 5 of 12 with RD on 2/10

## 2025-02-04 ENCOUNTER — OFFICE VISIT (OUTPATIENT)
Dept: SLEEP CENTER | Facility: CLINIC | Age: 25
End: 2025-02-04
Payer: COMMERCIAL

## 2025-02-04 VITALS
SYSTOLIC BLOOD PRESSURE: 130 MMHG | HEIGHT: 76 IN | HEART RATE: 81 BPM | DIASTOLIC BLOOD PRESSURE: 76 MMHG | WEIGHT: 315 LBS | OXYGEN SATURATION: 98 % | BODY MASS INDEX: 38.36 KG/M2

## 2025-02-04 DIAGNOSIS — R06.81 WITNESSED EPISODE OF APNEA: ICD-10-CM

## 2025-02-04 DIAGNOSIS — R06.83 SNORING: Primary | ICD-10-CM

## 2025-02-04 DIAGNOSIS — R35.1 NOCTURIA: ICD-10-CM

## 2025-02-04 DIAGNOSIS — E66.01 OBESITY, CLASS III, BMI 40-49.9 (MORBID OBESITY) (HCC): ICD-10-CM

## 2025-02-04 PROCEDURE — 99204 OFFICE O/P NEW MOD 45 MIN: CPT | Performed by: STUDENT IN AN ORGANIZED HEALTH CARE EDUCATION/TRAINING PROGRAM

## 2025-02-04 NOTE — PROGRESS NOTES
Name: Vimal Solorio      : 2000      MRN: [id number]  Encounter Provider: Octaviano Collins MD  Encounter Date: 2025   Encounter department: [facility]    :  Assessment & Plan  Snoring  Obstructive Sleep Apnea - Discussed pathophysiology of CHIP, consequences of untreated CHIP and treatment options including PAP therapy, mandibular advancement device, positional therapy, and surgical referral. - Discussed risks of sleepy driving and mitigation strategies (napping). Patient agrees to not drive if tired or sleepy. - Advised avoidance of alcohol and centrally acting medications as these can worsen CHIP.  -Patient with reported symptoms of snoring, as well as witnessed apneas by his significant other during sleep.  He also has airway crowding on exam.  His constellation of signs and symptoms may be suggestive of undiagnosed sleep disordered breathing.  Home sleep testing has been ordered today in the office.  -I have asked the patient to return to the office after sleep testing is completed to review study results and treatment options.  Patient verbalized understanding and stated that he would do so.  Orders:    Home Study; Future    Witnessed episode of apnea  As above.       Obesity, Class III, BMI 40-49.9 (morbid obesity) (HCC)  Morbid Obesity - We reviewed the association of obesity on obstructive sleep apnea and sleep disordered breathing. Encouraged patient to follow healthy diet, regular exercise regimen, and pursue weight loss to manage symptoms.   -Patient with history of morbid obesity.  He was referred by bariatrics for evaluation of possible bariatric surgery.  We spent extensive time discussing the impact of sleep apnea on the perioperative period.  We discussed if he had significant sleep apnea on home sleep testing that CPAP therapy would be recommended.  -I also discussed with the patient that if sleep testing was positive for sleep apnea I would advise him to let his  "primary surgeon and anesthesiologist know of this on the day of surgery.  If he is prescribed CPAP it is also beneficial for him to bring CPAP to the hospital for the perioperative and postoperative management of his bariatric surgery.  Patient verbalized understanding.  Orders:    Ambulatory referral to Sleep Medicine    Home Study; Future    Nocturia  There is an association between excess nocturia and sleep disordered breathing.  Patient waking regularly at least twice nightly for bathroom use.  For sleep testing and further evaluation as above.         History of Present Illness     Pertinent positives/negatives included in HPI and also as noted:     Objective   /76   Pulse 81   Ht 6' 3.5\" (1.918 m)   Wt (!) 226 kg (499 lb)   SpO2 98%   BMI 61.55 kg/m²     Neck Circumference: 16  [facility] New Patient Evaluation    Mr. Solorio is a 24 y.o. male with a PMH of morbid obesity (BMI 61), who presents as a new patient for evaluation of sleep disordered breathing.     I have reviewed the 1/13/2025 family medicine office note with BRENDA Sena.    I have reviewed the 12/16/2024 bariatrics note with Hu Nava MD.    History of Presenting Illness:    The patient snores. There are no choking and gasping episodes. There are  witnessed apneas. 2x, episode(s) of nocturia occur per night. The patient sleeps on his side. He sleeps with two pillows. There are no reports of nocturnal behaviors.     The patient's [place] sleepiness scale score is 3/24 (<=10 is normal). He has not been sleepy while driving. He has not had a fall-asleep motor vehicle accident. There are no reports of hypnagogic hallucinations, cataplexy or sleep paralysis.    In terms of the patient's sleep/wake cycle symptoms:  Bedtime: 11:00pm -when he is working he will fall asleep right after work around 8 AM and sleep until the early afternoon prior to returning to work.  SOL: Brief < 30 Minutes   Nocturnal awakenings: 2x, " Nocturia  Wakeup time: 8:00am-9:00am   Naps: Multiple Weekly, up to 1-2 hours    Total sleep time estimate: Approximately 7 hours.     Weekends are approximately similar to week days.    He has not tried any medications for poor sleep in the past.    His legs do not bother him on trying to initiate sleep.    Past Medical History:   Diagnosis Date    No pertinent past medical history         Past Surgical History:   Procedure Laterality Date    WISDOM TOOTH EXTRACTION Bilateral     [id number]        No prior upper airway surgeries.     No Known Allergies     No current outpatient medications on file prior to visit.     No current facility-administered medications on file prior to visit.     Family History: His family history includes Dementia in his maternal grandfather; No Known Problems in his brother, father, and mother.    Brother has sleep apnea using a CPAP machine. Father with snoring.     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   Caffeine: Denied Any Caffeine  Alcohol: Denied  Drugs: Denied  Tobacco: Denied  Vape: Denied  Exercise: Mix of Cardio and Strength Training    Patient's medications, allergies, past medical, surgical, social and family histories were reviewed in the electronic medical record and updated as appropriate.    Review of Systems: On review of systems, the patient reports: No AM Headaches, occasionally regular nasal sinus congestion, does wake with dry mouth and dry throat in morning.    Vitals:    02/04/25 1243   BP: 130/76   Pulse: 81   SpO2: 98%     Physical Examination:  Gen: No acute distress, not visibly anxious, speaking comfortably  H&N: MM III; no retro/micrognathia; macroglossia; no visible thyromegaly  Neuro: Alert and oriented x3, interactive  Psych: Pleasant, normal affect  Skin: No visible rashes  Respiratory: No accessory muscle use, breathing comfortably  Cardiac: No visible edema of  "extremities  Abdomen: Soft, NT, distended  Musculoskeletal: Normal ROM Intact of Extremities    Study Results:  I reviewed the following labs:    No results found for: \"FERRITIN\"    Most recent labs reviewed: Normal WBC 5.0, normal hemoglobin 15.1.    Most recent sodium 140, most recent potassium 4.4, both from 1/24/2025 both normal.    Lab Results   Component Value Date    WBC 5.0 01/24/2025    HGB 15.1 01/24/2025    HCT 46.1 01/24/2025    MCV 81 01/24/2025     01/24/2025       This SmartLink has not been configured with any valid records.       Lab Results   Component Value Date    SODIUM 140 01/24/2025    K 4.4 01/24/2025     01/24/2025    CO2 20 01/24/2025    BUN 11 01/24/2025    CREATININE 1.01 01/24/2025    GLUC 101 (H) 01/24/2025       This SmartLink has not been configured with any valid records.       Lab Results   Component Value Date    TSH 1.370 01/24/2025          Results/Data:  I have reviewed the results and report from the 8/24/2015 left hand x-ray.    Independent Findings Reviewed: No acute fracture or dislocation.    I have reviewed the results and report from the 10/8/2015 x-ray.    Independent Findings Reviewed: No acute fracture.    I answered the patient's questions to the best of my ability. We will continue with longitudinal follow-up for evaluation of sleep disordered breathing. Follow-up will be after sleep testing is completed.    Octaviano Collins MD  Sleep Medicine and Internal Medicine  [facility]  02/04/25    Portions of the record may have been created with voice recognition software.  Occasional wrong word or \"sound a like\" substitutions may have occurred due to the inherent limitations of voice recognition software.  Read the chart carefully and recognize, using context, where substitutions have occurred.         "

## 2025-02-04 NOTE — ASSESSMENT & PLAN NOTE
Morbid Obesity - We reviewed the association of obesity on obstructive sleep apnea and sleep disordered breathing. Encouraged patient to follow healthy diet, regular exercise regimen, and pursue weight loss to manage symptoms.   -Patient with history of morbid obesity.  He was referred by bariatrics for evaluation of possible bariatric surgery.  We spent extensive time discussing the impact of sleep apnea on the perioperative period.  We discussed if he had significant sleep apnea on home sleep testing that CPAP therapy would be recommended.  -I also discussed with the patient that if sleep testing was positive for sleep apnea I would advise him to let his primary surgeon and anesthesiologist know of this on the day of surgery.  If he is prescribed CPAP it is also beneficial for him to bring CPAP to the hospital for the perioperative and postoperative management of his bariatric surgery.  Patient verbalized understanding.  Orders:    Ambulatory referral to Sleep Medicine    Home Study; Future

## 2025-02-10 ENCOUNTER — CLINICAL SUPPORT (OUTPATIENT)
Dept: BARIATRICS | Facility: CLINIC | Age: 25
End: 2025-02-10

## 2025-02-10 VITALS — WEIGHT: 315 LBS | HEIGHT: 76 IN | BODY MASS INDEX: 38.36 KG/M2

## 2025-02-10 DIAGNOSIS — E66.01 MORBID (SEVERE) OBESITY DUE TO EXCESS CALORIES (HCC): Primary | ICD-10-CM

## 2025-02-10 PROCEDURE — RECHECK

## 2025-02-10 NOTE — PROGRESS NOTES
"Bariatric Nutrition F/U Note    Type of surgery    Preop (12 wt check)--5 of 12 wt check  Surgery Date: TBD--pt is leaning more toward the RYGB  Surgeon: Daryl Surgeons: Dr. Nava     Nutrition Assessment   Vimal Solorio  24 y.o.  male     Wt with BMI of 25: 203.2#  Pre-Op Excess Wt: 323#  Ht 6' 3.5\" (1.918 m)   Wt (!) 224 kg (494 lb 9.6 oz)   BMI 61.00 kg/m²     Start weight: 526# (1216/24)  Net weight change: -31.3#    [place]- St. Jeor Equation:    GVB=[id number]  Weight Maintenance 4083  Estimated calories for weight loss 1010-2208 ( 1-2# per wk wt loss - sedentary )  Estimated protein needs 92-138g (1.0-1.5 gms/kg IBW )   Estimated fluid needs 3220 ml(30-35 ml/kg IBW )      Weight History   Onset of Obesity: Childhood--when was in HS and wasn't planing sports and now poor eating schedule  Family history of obesity: No  Wt Loss Attempts: Exercise  Self Created Diets (Portion Control, Healthy Food Choices, etc.)  Juicing  Patient has tried the above for 6 months or more with insufficient weight loss or weight regain, which is why patient has requested to be evaluated for weight loss surgery today  Maximum Wt Lost: -20#    Review of History and Medications   Past Medical History:   Diagnosis Date    No pertinent past medical history      Past Surgical History:   Procedure Laterality Date    WISDOM TOOTH EXTRACTION Bilateral     [id number]     Social History     Socioeconomic History    Marital status: Single     Spouse name: Not on file    Number of children: Not on file    Years of education: Not on file    Highest education level: Not on file   Occupational History    Not on file   Tobacco Use    Smoking status: Never    Smokeless tobacco: Never   Vaping Use    Vaping status: Never Used   Substance and Sexual Activity    Alcohol use: Not Currently    Drug use: Not on file    Sexual activity: Not on file   Other Topics Concern    Not on file   Social History Narrative    Not on file     Social Drivers of Health " "    Financial Resource Strain: Not on file   Food Insecurity: Not on file   Transportation Needs: Not on file   Physical Activity: Not on file   Stress: Not on file   Social Connections: Not on file   Intimate Partner Violence: Not on file   Housing Stability: Not on file     No current outpatient medications on file.    Food Intake and Lifestyle Assessment   Food Intake Assessment completed via usual diet recall  Works 11p to 7am  Breakfast: 8am: now having a protein shake (premier) w/a bowl of cereal w/regular almond milk (honey bunches of oats or protein cheerios)--using a smaller bowl   Plays video games/watch movie  L: grilled chicken salad, boiled eggs, cheese w/[place] dressing (measures 1-2 tbsp)  Sleeps from 6pm until about 10pm  3pm: changed to the non-fat greek yogurt + pineapple or banana   7:30-8pm--will wake up to eat then back to sleep:  pan cooked 6oz chicken, a lot green beans/broccoli and 3/4 cup rice OR beef tips with veggies and rice OR salmon w/broccoli and rice.   More lately having a protein shake overnight at work and water.   *staying away from the snacking or if has a snack will be a healthier choice.    \"Days off\" Sunday and Monday  Wakes up later between 9-10am   Similar food choices/meals.     Beverage intake: water, juice, regular soda, and alcohol  Protein supplement: not at this time  Estimated protein intake per day: <75gm  Estimated fluid intake per day: 80oz water, no more juice or soda  Meals eaten away from home: no more take out, a lot more home cooked  Typical meal pattern: 2 meals per day and little snacks per day  Eating Behaviors: Consumption of high calorie/ high fat foods, Consumption of high calorie beverages, and Large portion sizes  Food allergies or intolerances: No Known Allergies  Cultural or Evangelical considerations: -    Physical Assessment  Physical Activity  Types of exercise: started at the gym-Sunday, Monday, Tuesday--cardio for 1hr yesterday, most day will also " "add in strength--arms, legs, abs --one on each day.  This week will change and doing back, leg, chest. Ends workout w/15mins of treadmill w/4 incline and 2.0 speed  Current physical limitations: -    Psychosocial Assessment   Support systems: significant other friend(s) relative(s)--girlfriend had sleeve sx  Socioeconomic factors: mom does most of the cooking and food shopping, but eats at girlfriend's more often who had sx.     Nutrition Diagnosis  Diagnosis: Overweight / Obesity (NC-3.3)  Related to: Physical inactivity and Excessive energy intake  As Evidenced by: BMI >25     Nutrition Prescription: Recommend the following diet  Regular    Interventions and Teaching   Discussed pre-op and post-op nutrition guidelines.       Patient educated and handouts provided.  Surgical changes to stomach / GI  Capacity of post-surgery stomach  Diet progression  Adequate hydration  Sugar and fat restriction to decrease \"dumping syndrome\"  Fat restriction to decrease steatorrhea  Expected weight loss  Weight loss plateaus/ possibility of weight regain  Exercise  Suggestions for pre-op diet  Nutrition considerations after surgery  Protein supplements  Meal planning and preparation  Appropriate carbohydrate, protein, and fat intake, and food/fluid choices to maximize safe weight loss, nutrient intake, and tolerance   Dietary and lifestyle changes  Possible problems with poor eating habits  Intuitive eating  Techniques for self monitoring and keeping daily food journal  Potential for food intolerance after surgery, and ways to deal with them including: lactose intolerance, nausea, reflux, vomiting, diarrhea, food intolerance, appetite changes, gas  Vitamin / Mineral supplementation of Multivitamin with minerals and Vitamin D      Education provided to: patient    Barriers to learning: No barriers identified    Readiness to change: preparation    Prior research on procedure: books, internet, discussed with provider, and friends or " family    Comprehension: verbalizes understanding     Expected Compliance: good    Recommendations  Pt is an appropriate candidate for surgery. Yes    Evaluation / Monitoring  Dietitian to Monitor: Eating pattern as discussed Tolerance of nutrition prescription Body weight Lab values Physical activity    Pre-op weight goals:  Do NOT Gain  Can go to BMI of 40:   325#  Encouraged weight loss w/ diet and lifestyle changes  Will be started on a 2 week liver shrinking diet, possibly shorter/longer as per the discretion of the surgeon/team, directly prior to surgery    Workflow: (Incomplete in Bold):  Psych and/or D+A Clearance: n/a  Blood Work: completed 1/24/25--acceptable  PCP letter: completed  Surgeon Appt: completed--nan on 12/16/24  EGD: scheduled on 2/21  Cardiac Risk Assessment with ECG: scheduled on 2/19/25  Sleep Studies:   has consult on 2/4 and now sleep study on 3/26 (on cancellation list)  Nicotine test: n/a  Pre-Operative Program: 12 wt checks--5 of 12 wt check    Pt with excellent weight loss since last visit. He reports the biggest change is that he is now going to the gym. He continues to eat three meals per day with a protein snack between and most often a protein shake overnight at work. He even stayed on his meal plan for the super bowl yesterday, avoiding the snacky foods and having a meal of salmon, veggies and rice.  He did start at the gym doing both cardio and strength.       Today we reviewed post-op vitamins and provided with samples and education materials for reference.     Goals  continue to avoid sugar sweetened beverages  64oz + calorie free fluids  Food journal via [email or web address]  Continue workout regimen  Complete lesson plans 1-6  Eat 3 meals per day with protein at each meal  Use protein shake as meal replacement or snack as needed--mostly overnight at work  Continue to avoid the mindless snacking  Try samples of bariatric vitamins    Time Spent:   30 mins

## 2025-02-17 ENCOUNTER — CLINICAL SUPPORT (OUTPATIENT)
Dept: BARIATRICS | Facility: CLINIC | Age: 25
End: 2025-02-17

## 2025-02-17 VITALS — WEIGHT: 315 LBS | BODY MASS INDEX: 61.1 KG/M2

## 2025-02-17 DIAGNOSIS — E66.01 MORBID (SEVERE) OBESITY DUE TO EXCESS CALORIES (HCC): Primary | ICD-10-CM

## 2025-02-17 PROCEDURE — RECHECK

## 2025-02-17 NOTE — PROGRESS NOTES
Patient presents for 6 of 12 weight check, current weight 495.4lbs.    Eating behaviors/food choices: Patient reports continued efforts to get in three meals a day. Denies any mindless snacking or emotional eating, still getting adequate hydration. He's not logging foods but he plans to try over the next week.    Activity/Exercise:  Patient reports not getting to the gym this past week, he just got a new puppy so that has taken up a lot of his time and focus. He was worried to leave the puppy on his own, discussed crate training, he is going to ask his girlfriend to help watch the puppy while he goes to the gym today.    Sleep/Rest:  Patient's sleep has been disrupted due to the new puppy, he reports his bed time has been pushed back in efforts to care for his dog. He notices the difference in his energy level with the sleep disruption, encouraged to be mindful of the impact sleep deprivation can have on eating habits and wellness.    Mental Health/Wellness:  Patient is happy to have a new puppy but this has added a new stress to his life. He is hopeful that with having the dog it will promote more activity, he will be taking the dog out for walks. Discussed the responsibility of having a new pet, the positive effect it has but also the body's interpretation of a new stressor. Encouraged patient to be mindful of how this change may influence his eating, activity and sleep regiment.    Workflow review:    Labs and PCP: both done  Psych and EGD: no eval needed, EGD scheduled 2/21  Nicotine: NA  Cardiology: scheduled 2/19  Sleep: consult done 2/4, sleep study scheduled 3/26  Weight Checks: 12    Goals:    - continue efforts to get adequate nutrition, be mindful of snacking  - continue efforts to be active and get adequate rest, be mindful of impact new pet can have on regiment  - tune into stressors of having a new pet, disruption to routine and stress management    Next Appointment:  weight check 7 of 12 with RD on  2/24

## 2025-02-19 ENCOUNTER — OFFICE VISIT (OUTPATIENT)
Dept: CARDIOLOGY CLINIC | Facility: CLINIC | Age: 25
End: 2025-02-19
Payer: COMMERCIAL

## 2025-02-19 VITALS
HEART RATE: 81 BPM | OXYGEN SATURATION: 94 % | TEMPERATURE: 98.3 F | HEIGHT: 76 IN | DIASTOLIC BLOOD PRESSURE: 74 MMHG | SYSTOLIC BLOOD PRESSURE: 138 MMHG | BODY MASS INDEX: 38.36 KG/M2 | WEIGHT: 315 LBS

## 2025-02-19 DIAGNOSIS — Z01.810 PREOPERATIVE CARDIOVASCULAR EXAMINATION: Primary | ICD-10-CM

## 2025-02-19 DIAGNOSIS — E66.01 OBESITY, CLASS III, BMI 40-49.9 (MORBID OBESITY) (HCC): ICD-10-CM

## 2025-02-19 PROCEDURE — 93000 ELECTROCARDIOGRAM COMPLETE: CPT | Performed by: INTERNAL MEDICINE

## 2025-02-19 PROCEDURE — 99203 OFFICE O/P NEW LOW 30 MIN: CPT | Performed by: INTERNAL MEDICINE

## 2025-02-19 NOTE — PROGRESS NOTES
Cardiology Consultation     Vimal Solorio  [id number]  2000  [facility]  [facility] [place] [id number] [facility] [id number] [phone]    Orders Placed This Encounter   Procedures    POCT ECG         Assessment & Plan  Preoperative cardiovascular examination  Low risk for proposed bariatric surgery. No cardiac testing is indicated.  Not on any cardiac medications.  Currently asymptomatic from a cardiac standpoint.    Can follow-up with me as needed.  Obesity, Class III, BMI 40-49.9 (morbid obesity) (Formerly [facility])  Undergoing evaluation for bariatric surgery. No contraindication to proceeding. Low risk.        History of Present Illness:    24-year-old male without any significant cardiac history. He is morbidly obese, being evaluated for bariatric surgery and comes for preoperative evaluation.    He is try to exercise at the gym does incline on the treadmill denies any symptoms or limitations.  No family history of any heart disease.  No personal history of any heart disease or prior cardiac evaluation.    Currently without any cardiac symptoms of chest pain, shortness of breath, dizziness, lightheadedness presyncope, or syncope.    Patient Active Problem List   Diagnosis    Obesity, Class III, BMI 40-49.9 (morbid obesity) (Formerly [facility])     Past Medical History:   Diagnosis Date    No pertinent past medical history      Social History     Tobacco Use    Smoking status: Never    Smokeless tobacco: Never   Vaping Use    Vaping status: Never Used   Substance Use Topics    Alcohol use: Not Currently      Family History   Problem Relation Age of Onset    No Known Problems Mother     No Known Problems Father     No Known Problems Brother     Dementia Maternal Grandfather      Past Surgical History:   Procedure Laterality Date    WISDOM TOOTH EXTRACTION Bilateral     [id number]     No current outpatient medications on file.  No Known Allergies    Vitals:     "02/19/25 1006   BP: 138/74   BP Location: Left arm   Patient Position: Sitting   Cuff Size: Adult   Pulse: 81   Temp: 98.3 °F (36.8 °C)   TempSrc: Tympanic   SpO2: 94%   Weight: (!) 223 kg (491 lb 12.8 oz)   Height: 6' 3.5\" (1.918 m)     Vitals:    02/19/25 1006   Weight: (!) 223 kg (491 lb 12.8 oz)      Height: 6' 3.5\" (191.8 cm)   Body mass index is 60.66 kg/m².    Physical Exam:  GENERAL: Alert, well appearing, and in no distress  HEENT:  PERRL, EOMI, no scleral icterus, no conjunctival pallor  NECK:  Supple, No elevated JVP, no thyromegaly, no carotid bruits  HEART:  Regular rate and rhythm, normal S1/S2, no S3/S4, no murmur or rub  LUNGS:  Clear to auscultation bilaterally  ABDOMEN: Obese, soft, non-tender, positive bowel sounds, no rebound or guarding  EXTREMITIES:  No edema  VASCULAR:  Normal pedal pulses   NEURO: No focal deficits,  SKIN: Normal without suspicious lesions on exposed skin      ROS:  Except as noted in HPI, is otherwise reviewed in detail and a 12 point review of systems is negative.    Labs:  Lab Results   Component Value Date    SODIUM 140 01/24/2025    K 4.4 01/24/2025     01/24/2025    CREATININE 1.01 01/24/2025    BUN 11 01/24/2025    CO2 20 01/24/2025    ALT 32 01/24/2025    AST 24 01/24/2025    TSH 1.370 01/24/2025    HGBA1C 6.1 (H) 01/24/2025    WBC 5.0 01/24/2025    HGB 15.1 01/24/2025    HCT 46.1 01/24/2025     01/24/2025       No results found for: \"CHOL\"  Lab Results   Component Value Date    HDL 42 01/24/2025     Lab Results   Component Value Date    LDLCALC 87 01/24/2025     Lab Results   Component Value Date    TRIG 89 01/24/2025       EKG:  Sinus rhythm. 81 BPM    "

## 2025-02-21 ENCOUNTER — ANESTHESIA (OUTPATIENT)
Dept: PERIOP | Facility: HOSPITAL | Age: 25
End: 2025-02-21
Payer: COMMERCIAL

## 2025-02-21 ENCOUNTER — ANESTHESIA EVENT (OUTPATIENT)
Dept: PERIOP | Facility: HOSPITAL | Age: 25
End: 2025-02-21
Payer: COMMERCIAL

## 2025-02-21 ENCOUNTER — HOSPITAL ENCOUNTER (OUTPATIENT)
Dept: PERIOP | Facility: HOSPITAL | Age: 25
Setting detail: OUTPATIENT SURGERY
End: 2025-02-21
Attending: SURGERY
Payer: COMMERCIAL

## 2025-02-21 VITALS
WEIGHT: 315 LBS | RESPIRATION RATE: 18 BRPM | BODY MASS INDEX: 39.17 KG/M2 | SYSTOLIC BLOOD PRESSURE: 128 MMHG | OXYGEN SATURATION: 98 % | HEIGHT: 75 IN | DIASTOLIC BLOOD PRESSURE: 60 MMHG | TEMPERATURE: 98 F | HEART RATE: 83 BPM

## 2025-02-21 DIAGNOSIS — E66.01 MORBID OBESITY (HCC): ICD-10-CM

## 2025-02-21 PROCEDURE — 43239 EGD BIOPSY SINGLE/MULTIPLE: CPT | Performed by: SURGERY

## 2025-02-21 PROCEDURE — 88305 TISSUE EXAM BY PATHOLOGIST: CPT | Performed by: PATHOLOGY

## 2025-02-21 RX ORDER — SODIUM CHLORIDE, SODIUM LACTATE, POTASSIUM CHLORIDE, CALCIUM CHLORIDE 600; 310; 30; 20 MG/100ML; MG/100ML; MG/100ML; MG/100ML
INJECTION, SOLUTION INTRAVENOUS CONTINUOUS PRN
Status: DISCONTINUED | OUTPATIENT
Start: 2025-02-21 | End: 2025-02-21

## 2025-02-21 RX ORDER — PROPOFOL 10 MG/ML
INJECTION, EMULSION INTRAVENOUS AS NEEDED
Status: DISCONTINUED | OUTPATIENT
Start: 2025-02-21 | End: 2025-02-21

## 2025-02-21 RX ORDER — LIDOCAINE HYDROCHLORIDE 20 MG/ML
INJECTION, SOLUTION EPIDURAL; INFILTRATION; INTRACAUDAL; PERINEURAL AS NEEDED
Status: DISCONTINUED | OUTPATIENT
Start: 2025-02-21 | End: 2025-02-21

## 2025-02-21 RX ADMIN — SODIUM CHLORIDE, SODIUM LACTATE, POTASSIUM CHLORIDE, AND CALCIUM CHLORIDE: .6; .31; .03; .02 INJECTION, SOLUTION INTRAVENOUS at 08:51

## 2025-02-21 RX ADMIN — PROPOFOL 300 MG: 10 INJECTION, EMULSION INTRAVENOUS at 08:58

## 2025-02-21 RX ADMIN — LIDOCAINE HYDROCHLORIDE 100 MG: 20 INJECTION, SOLUTION EPIDURAL; INFILTRATION; INTRACAUDAL; PERINEURAL at 08:58

## 2025-02-21 NOTE — ANESTHESIA PREPROCEDURE EVALUATION
Procedure:  EGD    Relevant Problems   No relevant active problems        Physical Exam    Airway    Mallampati score: III  TM Distance: >3 FB  Neck ROM: full     Dental   No notable dental hx     Cardiovascular  Cardiovascular exam normal    Pulmonary  Pulmonary exam normal     Other Findings        Anesthesia Plan  ASA Score- 3     Anesthesia Type- IV sedation with anesthesia with ASA Monitors.         Additional Monitors:     Airway Plan:            Plan Factors-Exercise tolerance (METS): >4 METS.    Chart reviewed.   Existing labs reviewed. Patient summary reviewed.    Patient is not a current smoker.      Obstructive sleep apnea risk education given perioperatively.        Induction-     Postoperative Plan-     Perioperative Resuscitation Plan - Level 1 - Full Code.       Informed Consent- Anesthetic plan and risks discussed with patient.  I personally reviewed this patient with the CRNA. Discussed and agreed on the Anesthesia Plan with the CRNA..      NPO Status:  Vitals Value Taken Time   Date of last liquid 02/20/25 02/21/25 0823   Time of last liquid 2300 02/21/25 0823   Date of last solid 02/20/25 02/21/25 0823   Time of last solid 2200 02/21/25 0823

## 2025-02-21 NOTE — ANESTHESIA POSTPROCEDURE EVALUATION
Post-Op Assessment Note    CV Status:  Stable  Pain Score: 0    Pain management: adequate       Mental Status:  Sleepy   Hydration Status:  Stable   PONV Controlled:  None   Airway Patency:  Patent  Two or more mitigation strategies used for obstructive sleep apnea   Post Op Vitals Reviewed: Yes    No anethesia notable event occurred.    Staff: CRNA           Last Filed PACU Vitals:  Vitals Value Taken Time   Temp     Pulse     BP     Resp     SpO2

## 2025-02-21 NOTE — H&P
"This is a 24 y.o. male with a history of morbid obesity and Body mass index is 61.56 kg/m².  Here for an EGD to evaluate the anatomy of the GI tract and to rule out the presence of H. pylori.    Physical Exam    /72   Pulse 84   Temp 98 °F (36.7 °C) (Temporal)   Resp 18   Ht 6' 3\" (1.905 m)   Wt (!) 223 kg (492 lb 8.1 oz)   SpO2 98%   BMI 61.56 kg/m²    AAOx3  RRR  CTA B  Abdomen obese. Benign.  Extremities warm and dry       A/P:    This is a 24 y.o. male with a history of morbid obesity and Body mass index is 61.56 kg/m²..    Will proceed with the EGD and biopsies.      Marycruz Platt MD  2/21/2025  8:37 AM         "

## 2025-02-21 NOTE — PERIOPERATIVE NURSING NOTE
Report given to Grisel RABAGO. Bed locked in lowest position with side rails up x2 & call bell within reach.

## 2025-02-24 ENCOUNTER — CLINICAL SUPPORT (OUTPATIENT)
Dept: BARIATRICS | Facility: CLINIC | Age: 25
End: 2025-02-24

## 2025-02-24 VITALS — HEIGHT: 76 IN | BODY MASS INDEX: 38.36 KG/M2 | WEIGHT: 315 LBS

## 2025-02-24 DIAGNOSIS — E66.01 MORBID (SEVERE) OBESITY DUE TO EXCESS CALORIES (HCC): Primary | ICD-10-CM

## 2025-02-24 PROCEDURE — RECHECK

## 2025-02-24 PROCEDURE — 88305 TISSUE EXAM BY PATHOLOGIST: CPT | Performed by: PATHOLOGY

## 2025-02-24 NOTE — PROGRESS NOTES
"Bariatric Nutrition F/U Note    Type of surgery    Preop (12 wt check)--7 of 12 wt check  Surgery Date: TBD--pt is leaning more toward the RYGB  Surgeon: Daryl Surgeons: Dr. Nava     Nutrition Assessment   Vimal Solorio  24 y.o.  male     Wt with BMI of 25: 203.2#  Pre-Op Excess Wt: 323#  Ht 6' 3.5\" (1.918 m)   Wt (!) 220 kg (484 lb)   BMI 59.70 kg/m²     Start weight: 526# (1216/24)  Net weight change: -42#    [place]- St. Jeor Equation:    OME=3953  Weight Maintenance 4083  Estimated calories for weight loss 9343-3738 ( 1-2# per wk wt loss - sedentary )  Estimated protein needs 92-138g (1.0-1.5 gms/kg IBW )   Estimated fluid needs 3220 ml(30-35 ml/kg IBW )      Weight History   Onset of Obesity: Childhood--when was in HS and wasn't planing sports and now poor eating schedule  Family history of obesity: No  Wt Loss Attempts: Exercise  Self Created Diets (Portion Control, Healthy Food Choices, etc.)  Juicing  Patient has tried the above for 6 months or more with insufficient weight loss or weight regain, which is why patient has requested to be evaluated for weight loss surgery today  Maximum Wt Lost: -20#    Review of History and Medications   Past Medical History:   Diagnosis Date    No pertinent past medical history      Past Surgical History:   Procedure Laterality Date    WISDOM TOOTH EXTRACTION Bilateral     [id number]     Social History     Socioeconomic History    Marital status: Single     Spouse name: Not on file    Number of children: Not on file    Years of education: Not on file    Highest education level: Not on file   Occupational History    Not on file   Tobacco Use    Smoking status: Never    Smokeless tobacco: Never   Vaping Use    Vaping status: Never Used   Substance and Sexual Activity    Alcohol use: Not Currently    Drug use: Not on file    Sexual activity: Not on file   Other Topics Concern    Not on file   Social History Narrative    Not on file     Social Drivers of Health     Financial " "Resource Strain: Not on file   Food Insecurity: Not on file   Transportation Needs: Not on file   Physical Activity: Not on file   Stress: Not on file   Social Connections: Not on file   Intimate Partner Violence: Not on file   Housing Stability: Not on file     No current outpatient medications on file.    Food Intake and Lifestyle Assessment   Food Intake Assessment completed via usual diet recall  Works 11p to 7am  Breakfast: 8-9am: having Protein cheerios   Plays video games/watch movie  S: Greek yogurt w/berries   L: 1-2pm-salad (veggies) w/grilled chicken, 2 boiled eggs, cheese, a few croutons w/Martiniquais dressing (measures 1-2 tbsp)  Sleeps from 6pm until about 10pm  3pm: changed to the non-fat greek yogurt + pineapple or banana   7:30-8pm--will wake up to eat then back to sleep:  last night was crab cake (1), at about 1/2 cup mashed potato and broccoli --feels getting full quicker OR pan cooked 6oz chicken, a lot green beans/broccoli and 3/4 cup rice OR beef tips with veggies and rice OR salmon w/broccoli and rice.   Overnight at work 2-3am: protein shake or another greek yogurt    \"Days off\" Sunday and Monday  Wakes up later between 9-10am   Similar food choices/meals.     Beverage intake: water, juice, regular soda, and alcohol  Protein supplement: not at this time  Estimated protein intake per day: <75gm  Estimated fluid intake per day: 80oz water, no more juice or soda  Meals eaten away from home: no more take out, a lot more home cooked  Typical meal pattern: 2 meals per day and little snacks per day  Eating Behaviors: Consumption of high calorie/ high fat foods, Consumption of high calorie beverages, and Large portion sizes  Food allergies or intolerances: No Known Allergies  Cultural or [place] considerations: -    Physical Assessment  Physical Activity  Types of exercise: started at the gym-Sunday, Monday, Tuesday--cardio for 1hr yesterday, most day will also add in strength--arms, legs, abs --one on " "each day, arms, chest, back another day.  Ends workout w/15mins of treadmill w/4 incline and 2.0 speed. Does use a sauna suite--encouraged hydration.   Now has a 6  week old dog--once gets all the dog's shots will start walking.   Current physical limitations: -    Psychosocial Assessment   Support systems: significant other friend(s) relative(s)--girlfriend had sleeve sx  Socioeconomic factors: mom does most of the cooking and food shopping, but eats at girlfriend's more often who had sx.     Nutrition Diagnosis  Diagnosis: Overweight / Obesity ([place]-3.3)  Related to: Physical inactivity and Excessive energy intake  As Evidenced by: BMI >25     Nutrition Prescription: Recommend the following diet  Regular    Interventions and Teaching   Discussed pre-op and post-op nutrition guidelines.       Patient educated and handouts provided.  Surgical changes to stomach / GI  Capacity of post-surgery stomach  Diet progression  Adequate hydration  Sugar and fat restriction to decrease \"dumping syndrome\"  Fat restriction to decrease steatorrhea  Expected weight loss  Weight loss plateaus/ possibility of weight regain  Exercise  Suggestions for pre-op diet  Nutrition considerations after surgery  Protein supplements  Meal planning and preparation  Appropriate carbohydrate, protein, and fat intake, and food/fluid choices to maximize safe weight loss, nutrient intake, and tolerance   Dietary and lifestyle changes  Possible problems with poor eating habits  Intuitive eating  Techniques for self monitoring and keeping daily food journal  Potential for food intolerance after surgery, and ways to deal with them including: lactose intolerance, nausea, reflux, vomiting, diarrhea, food intolerance, appetite changes, gas  Vitamin / Mineral supplementation of Multivitamin with minerals and Vitamin D      Education provided to: patient    Barriers to learning: No barriers identified    Readiness to change: preparation    Prior research on " procedure: books, internet, discussed with provider, and friends or family    Comprehension: verbalizes understanding     Expected Compliance: good    Recommendations  Pt is an appropriate candidate for surgery. Yes    Evaluation / Monitoring  Dietitian to Monitor: Eating pattern as discussed Tolerance of nutrition prescription Body weight Lab values Physical activity    Pre-op weight goals:  Do NOT Gain  Can go to BMI of 40:   325#  Encouraged weight loss w/ diet and lifestyle changes  Will be started on a 2 week liver shrinking diet, possibly shorter/longer as per the discretion of the surgeon/team, directly prior to surgery    Workflow: (Incomplete in Bold):  Psych and/or D+A Clearance: n/a  Blood Work: completed 1/24/25--acceptable  PCP letter: completed  Surgeon Appt: completed--nan on 12/16/24  EGD: completed on 2/21  Cardiac Risk Assessment with ECG: completed on 2/19/25  Sleep Studies:   had consult on 2/4 and now sleep study on 3/26 (on cancellation list)  Nicotine test: n/a  Pre-Operative Program: 12 wt checks--7 of 12 wt check      Goals  continue to avoid sugar sweetened beverages  64oz + calorie free fluids  Food journal via [employer]  Continue workout regimen  Complete lesson plans 1-6  Eat 3 meals per day with protein at each meal  Use protein shake as meal replacement or snack as needed--mostly overnight at work  Continue to avoid the mindless snacking  Try samples of bariatric vitamins provided at 2/10 visit  Try snacks from protein snack list to provide variety    Time Spent:   30 mins

## 2025-02-25 ENCOUNTER — RESULTS FOLLOW-UP (OUTPATIENT)
Dept: BARIATRICS | Facility: CLINIC | Age: 25
End: 2025-02-25

## 2025-03-04 ENCOUNTER — CLINICAL SUPPORT (OUTPATIENT)
Dept: BARIATRICS | Facility: CLINIC | Age: 25
End: 2025-03-04

## 2025-03-04 VITALS — BODY MASS INDEX: 59.77 KG/M2 | WEIGHT: 315 LBS

## 2025-03-04 DIAGNOSIS — E66.01 MORBID (SEVERE) OBESITY DUE TO EXCESS CALORIES (HCC): Primary | ICD-10-CM

## 2025-03-04 PROCEDURE — RECHECK

## 2025-03-04 NOTE — PROGRESS NOTES
Patient presents for 8 of 12 weight check, current weight 484.6lbs.    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 He is considering starting a pescatarian diet and eating organic foods. Explained benefits of organic food but not necessary for overall weight loss and health, to allow for variety in his diet as his palate and preference may change after surgery. Discussed the benefit of allowing for diverse food options, avoid cutting out too many foods with nutritional value and to evaluate the what, why and how much of what he is eating when making food choices. Patient is respecting satiation cues even when there is more food on his plate. Encouraged to talk with those who plate his food, using measuring tools.    Activity/Exercise:  Patient reports not getting to the gym last week because no one was available to watch his new puppy. He is crate training him but he is trying not to keep him in there too long, he used to go right after work and wants to try to do so this week. Discussed ways for him to be creative with activity, he plans to use weight bench at home. He has to get shots for his dog at 8 weeks so about a week or so from now he can start taking him out for walks.    Sleep/Rest:  Patient reports sleep when he first got his puppy was very poor but the dog is sleeping more consistently so he is sleeping better. He has his sleep study scheduled later in March, he believes he does have some kind of sleep disorder as his girlfriend said he does not sleep well. Discussed requirements of CPAP use for surgery if moderate or severe, highly recommend the treatment of CHIP for overall health.    Mental Health/Wellness:  Patient denies any stress or mood changes, he is overall doing well. He is trying to bathroom and crate train his new puppy which takes up a lot of his time. Discussed the increased responsibility he has with a dog  now, similar to having a child, how to balance his own needs with the responsibilities that has has.    Workflow review:    Labs and PCP: both done  Psych and EGD: no eval needed, EGD done 2/21  Nicotine: NA  Cardiology: done 2/19  Sleep: consult done 2/4, sleep study scheduled 3/26  Weight Checks: 12    Goals:    - allow for variety in foods, monitor portions, respect hunger and satiation cues  - increase activity with use of workout equipment at home, going for walks  - be mindful of balancing responsibilities with self care    Next Appointment:  weight check 9 of 12 with RD on 3/10

## 2025-03-10 ENCOUNTER — CLINICAL SUPPORT (OUTPATIENT)
Dept: BARIATRICS | Facility: CLINIC | Age: 25
End: 2025-03-10

## 2025-03-10 VITALS — BODY MASS INDEX: 38.36 KG/M2 | WEIGHT: 315 LBS | HEIGHT: 76 IN

## 2025-03-10 DIAGNOSIS — E66.01 MORBID (SEVERE) OBESITY DUE TO EXCESS CALORIES (HCC): Primary | ICD-10-CM

## 2025-03-10 PROCEDURE — RECHECK

## 2025-03-10 NOTE — PROGRESS NOTES
"Bariatric Nutrition F/U Note    Type of surgery    Preop (12 wt check)--9 of 12 wt check  Surgery Date: TBD--pt is leaning more toward the RYGB  Surgeon: Daryl Surgeons: Dr. Nava     Nutrition Assessment   Vimal Solorio  24 y.o.  male     Wt with BMI of 25: 203.2#  Pre-Op Excess Wt: 323#  Ht 6' 3.5\" (1.918 m)   Wt (!) 221 kg (487 lb 6.4 oz)   BMI 60.12 kg/m²     Start weight: 526# (1216/24)  Net weight change: -38.6#    [place]- St. Jeor Equation:    TER=3516  Weight Maintenance 4083  Estimated calories for weight loss 3439-2339 ( 1-2# per wk wt loss - sedentary )  Estimated protein needs 92-138g (1.0-1.5 gms/kg IBW )   Estimated fluid needs 3220 ml(30-35 ml/kg IBW )      Weight History   Onset of Obesity: Childhood--when was in HS and wasn't planing sports and now poor eating schedule  Family history of obesity: No  Wt Loss Attempts: Exercise  Self Created Diets (Portion Control, Healthy Food Choices, etc.)  Juicing  Patient has tried the above for 6 months or more with insufficient weight loss or weight regain, which is why patient has requested to be evaluated for weight loss surgery today  Maximum Wt Lost: -20#    Review of History and Medications   Past Medical History:   Diagnosis Date    No pertinent past medical history      Past Surgical History:   Procedure Laterality Date    WISDOM TOOTH EXTRACTION Bilateral     [id number]     Social History     Socioeconomic History    Marital status: Single     Spouse name: Not on file    Number of children: Not on file    Years of education: Not on file    Highest education level: Not on file   Occupational History    Not on file   Tobacco Use    Smoking status: Never    Smokeless tobacco: Never   Vaping Use    Vaping status: Never Used   Substance and Sexual Activity    Alcohol use: Not Currently    Drug use: Not on file    Sexual activity: Not on file   Other Topics Concern    Not on file   Social History Narrative    Not on file     Social Drivers of Health " "    Financial Resource Strain: Not on file   Food Insecurity: Not on file   Transportation Needs: Not on file   Physical Activity: Not on file   Stress: Not on file   Social Connections: Not on file   Intimate Partner Violence: Not on file   Housing Stability: Not on file     No current outpatient medications on file.    Food Intake and Lifestyle Assessment   Food Intake Assessment completed via usual diet recall  Was in [place] for from Fri to Sunday for girlfriend's bday so eating out and more high calorie food--BBQ, mac & cheese, ribs, etc.    Works 11p to 7am  Breakfast: 8-9am: Back to a Protein shake in the morning  Plays video games/watch movie  S: Greek yogurt w/berries   L: 1-2pm-salad (veggies) w/grilled chicken, 2 boiled eggs, cheese, a few croutons w/Turkmen dressing (measures 1-2 tbsp)  Sleeps from 6pm until about 10pm  3pm: [employer] bar --made suggestions of other protein bars  7:30-8pm--will wake up to eat then back to sleep:  steak/chicken , 1/2 cup mashed potatoes/rice, a lot of broccoli OR crab cake (1), at about 1/2 cup mashed potato and broccoli --feels getting full quicker OR pan cooked 6oz chicken, a lot green beans/broccoli and 3/4 cup rice OR beef tips with veggies and rice OR salmon w/broccoli and rice.   Overnight at work 2-3am: protein shake and/or another greek yogurt    \"Days off\" Sunday and Monday  Wakes up later between 9-10am   Similar food choices/meals.     Beverage intake: water, juice, regular soda, and alcohol  Protein supplement: not at this time  Estimated protein intake per day: <75gm  Estimated fluid intake per day: 80oz water, no more juice or soda  Meals eaten away from home: no more take out, a lot more home cooked  Typical meal pattern: 2 meals per day and little snacks per day  Eating Behaviors: Consumption of high calorie/ high fat foods, Consumption of high calorie beverages, and Large portion sizes  Food allergies or intolerances: No Known Allergies  Cultural or Mormon " "considerations: -    Physical Assessment  Physical Activity  Types of exercise: Struggled with going to the gym last week.    Usually regimen is:  Sunday, Monday, Tuesday--cardio + most day will also add in strength--arms, legs, abs --one on each day, arms, chest, back another day.  Ends workout w/15mins of treadmill w/4 incline and 2.0 speed. Does use a sauna suite--encouraged hydration.   Now has a 8  week old dog--once gets all the dog's shots will start walking.   Current physical limitations: -    Psychosocial Assessment   Support systems: significant other friend(s) relative(s)--girlfriend had sleeve sx  Socioeconomic factors: mom does most of the cooking and food shopping, but eats at girlfriend's more often who had sx.     Nutrition Diagnosis  Diagnosis: Overweight / Obesity ([place]-3.3)  Related to: Physical inactivity and Excessive energy intake  As Evidenced by: BMI >25     Nutrition Prescription: Recommend the following diet  Regular    Interventions and Teaching   Discussed pre-op and post-op nutrition guidelines.       Patient educated and handouts provided.  Surgical changes to stomach / GI  Capacity of post-surgery stomach  Diet progression  Adequate hydration  Sugar and fat restriction to decrease \"dumping syndrome\"  Fat restriction to decrease steatorrhea  Expected weight loss  Weight loss plateaus/ possibility of weight regain  Exercise  Suggestions for pre-op diet  Nutrition considerations after surgery  Protein supplements  Meal planning and preparation  Appropriate carbohydrate, protein, and fat intake, and food/fluid choices to maximize safe weight loss, nutrient intake, and tolerance   Dietary and lifestyle changes  Possible problems with poor eating habits  Intuitive eating  Techniques for self monitoring and keeping daily food journal  Potential for food intolerance after surgery, and ways to deal with them including: lactose intolerance, nausea, reflux, vomiting, diarrhea, food intolerance, " appetite changes, gas  Vitamin / Mineral supplementation of Multivitamin with minerals and Vitamin D    Education provided to: patient    Barriers to learning: No barriers identified    Readiness to change: preparation    Prior research on procedure: books, internet, discussed with provider, and friends or family    Comprehension: verbalizes understanding     Expected Compliance: good    Recommendations  Pt is an appropriate candidate for surgery. Yes    Evaluation / Monitoring  Dietitian to Monitor: Eating pattern as discussed Tolerance of nutrition prescription Body weight Lab values Physical activity    Pre-op weight goals:  Do NOT Gain  Can go to BMI of 40:   325#  Encouraged weight loss w/ diet and lifestyle changes  Will be started on a 2 week liver shrinking diet, possibly shorter/longer as per the discretion of the surgeon/team, directly prior to surgery    Workflow: (Incomplete in Bold):  Psych and/or D+A Clearance: n/a  Blood Work: completed 1/24/25--acceptable  PCP letter: completed  Surgeon Appt: completed--nan on 12/16/24  EGD: completed on 2/21  Cardiac Risk Assessment with ECG: completed on 2/19/25  Sleep Studies:   had consult on 2/4 and now sleep study on 3/26   Nicotine test: n/a  Pre-Operative Program: 12 wt checks--9 of 12 wt check    Goals  continue to avoid sugar sweetened beverages  64oz + calorie free fluids  Food journal via [employer]  Continue workout regimen--get back to the gym after being away from [place] this past week.   Complete lesson plans 1-6  Eat 3 meals per day with protein at each meal  Use protein shake as meal replacement or snack as needed--mostly overnight at work  Continue to avoid the mindless snacking  Try protein bar in place of Special K crisp bar  Will discuss liver shrinking diet at next RD wt check     Time Spent:   30 mins

## 2025-03-17 ENCOUNTER — CLINICAL SUPPORT (OUTPATIENT)
Dept: BARIATRICS | Facility: CLINIC | Age: 25
End: 2025-03-17

## 2025-03-17 VITALS — WEIGHT: 315 LBS | BODY MASS INDEX: 59.13 KG/M2

## 2025-03-17 DIAGNOSIS — E66.01 MORBID (SEVERE) OBESITY DUE TO EXCESS CALORIES (HCC): Primary | ICD-10-CM

## 2025-03-17 PROCEDURE — RECHECK

## 2025-03-17 NOTE — PROGRESS NOTES
Patient presents for weight check 10 of 12, current weight 479.4lbs.    Eating behaviors/food choices: Reports continued success with his eating habits. Getting adequate nutrition even with his overnight shifts. He is looking forward to talk with RD about the pre-op diet, suggested to reference section in his manual.    Activity/Exercise:  Patient reports getting back on track with his exercise regiment. He has been able to get to the gym after work, he has really put in good workouts and feels really good with his progress. Now that his dog has his shots, he plans to go for walks with him, playing with him frequently to tire him out.    Sleep/Rest:  Patient reports sleep has been very good, his dog has been sleeping longer so his sleep has felt more restful. He is going for his sleep study on 3/26.    Mental Health/Wellness:  Patient denies any stress or mood changes, he continues to be excited for his surgery, he denies any hesitation. His family is planning a cruise for November, was questioning whether he could go. Discussed his projected surgery and diet phases, more than likely will be on regular diet by that time. Discussed the temptations of being on vacation, the availability of food on cruise and how to modify choices to fit with his post surgical life. Patient reports one thing he has learned from his time in the program is discipline, he has modified his behaviors and feels he will be able to do that if he goes on a cruise.     Workflow review:    Labs and PCP: both done  Psych and EGD: no eval needed, EGD done 2/21  Nicotine: NA  Cardiology: done 2/19  Sleep: consult done 2/4, sleep study scheduled 3/26  Weight Checks: 12    Goals:    - continue mindful eating efforts  - continue physical activity efforts  - continue to evaluate relationship with food, possible temptations post-op and fidelity to post-op regiment.    Next Appointment:  weight check 11 of 12 with RD on 3/25

## 2025-03-25 ENCOUNTER — CLINICAL SUPPORT (OUTPATIENT)
Dept: BARIATRICS | Facility: CLINIC | Age: 25
End: 2025-03-25

## 2025-03-25 VITALS — WEIGHT: 315 LBS | HEIGHT: 76 IN | BODY MASS INDEX: 38.36 KG/M2

## 2025-03-25 DIAGNOSIS — E66.01 MORBID (SEVERE) OBESITY DUE TO EXCESS CALORIES (HCC): Primary | ICD-10-CM

## 2025-03-25 PROCEDURE — RECHECK

## 2025-03-25 NOTE — PROGRESS NOTES
"Bariatric Nutrition F/U Note    Type of surgery    Preop (12 wt check)--11 of 12 wt check  Surgery Date: TBD--pt is leaning more toward the RYGB  Surgeon: Daryl Surgeons: Dr. Nava     Nutrition Assessment   Vimal Solorio  25 y.o.  male     Wt with BMI of 25: 203.2#  Pre-Op Excess Wt: 323#  Ht 6' 3.5\" (1.918 m)   Wt (!) 217 kg (477 lb 6.4 oz)   BMI 58.88 kg/m²     Start weight: 526# (1216/24)  Net weight change: -48.5#    [place]- St. Jeor Equation:    KHV=7244  Weight Maintenance 4083  Estimated calories for weight loss 8773-1388 ( 1-2# per wk wt loss - sedentary )  Estimated protein needs 92-138g (1.0-1.5 gms/kg IBW )   Estimated fluid needs 3220 ml(30-35 ml/kg IBW )      Weight History   Onset of Obesity: Childhood--when was in HS and wasn't planing sports and now poor eating schedule  Family history of obesity: No  Wt Loss Attempts: Exercise  Self Created Diets (Portion Control, Healthy Food Choices, etc.)  Juicing  Patient has tried the above for 6 months or more with insufficient weight loss or weight regain, which is why patient has requested to be evaluated for weight loss surgery today  Maximum Wt Lost: -20#    Review of History and Medications   Past Medical History:   Diagnosis Date    No pertinent past medical history      Past Surgical History:   Procedure Laterality Date    WISDOM TOOTH EXTRACTION Bilateral     9010-0165     Social History     Socioeconomic History    Marital status: Single     Spouse name: Not on file    Number of children: Not on file    Years of education: Not on file    Highest education level: Not on file   Occupational History    Not on file   Tobacco Use    Smoking status: Never    Smokeless tobacco: Never   Vaping Use    Vaping status: Never Used   Substance and Sexual Activity    Alcohol use: Not Currently    Drug use: Not on file    Sexual activity: Not on file   Other Topics Concern    Not on file   Social History Narrative    Not on file     Social Drivers of Health " "    Financial Resource Strain: Not on file   Food Insecurity: Not on file   Transportation Needs: Not on file   Physical Activity: Not on file   Stress: Not on file   Social Connections: Not on file   Intimate Partner Violence: Not on file   Housing Stability: Not on file     No current outpatient medications on file.    Food Intake and Lifestyle Assessment   Food Intake Assessment completed via usual diet recall  Was his birthday weekend --went to [employer] and then dinner out. Ordered steak, potatoes, broccoli).  Also went out with his dad and went to [employer] and ordered shrimp and pasta    Works 11p to 7am  Breakfast: 8-9am: Protein shake in the morning  Plays video games/watch movie  S: Greek yogurt w/berries   L: 1-2pm-salad (veggies) w/grilled chicken, 2 boiled eggs, cheese, a few croutons w/[place] dressing (measures 1-2 tbsp)  Sleeps from 6pm until about 10pm  3pm: protein shake or bar  7:30-8pm--will wake up to eat then back to sleep:  last night was salmon, broccoli and rice. Other options steak/chicken , 1/2 cup mashed potatoes/rice, a lot of broccoli OR crab cake (1), at about 1/2 cup mashed potato and broccoli --feels getting full quicker OR pan cooked 6oz chicken, a lot green beans/broccoli and 3/4 cup rice OR beef tips with veggies and rice OR salmon w/broccoli and rice.   Overnight at work 2-3am: protein shake and/or another greek yogurt    \"Days off\" Sunday and Monday  Wakes up later between 9-10am   Similar food choices/meals.     Beverage intake: water, juice, regular soda, and alcohol  Protein supplement: not at this time  Estimated protein intake per day: <75gm  Estimated fluid intake per day: 80oz water, no more juice or soda  Meals eaten away from home: no more take out, a lot more home cooked  Typical meal pattern: 2 meals per day and little snacks per day  Eating Behaviors: Consumption of high calorie/ high fat foods, Consumption of high calorie beverages, and Large portion sizes  Food " "allergies or intolerances: No Known Allergies  Cultural or Caodaism considerations: -    Physical Assessment  Physical Activity  Types of exercise: didn't get to the gym this week, but the week before was at the gym a lot. Now going out for more walks with his puppy.  Usually regimen is:  Sunday, Monday, Tuesday--cardio + most day will also add in strength--arms, legs, abs --one on each day, arms, chest, back another day.  Ends workout w/15mins of treadmill w/4 incline and 2.0 speed. Does use a sauna suite--encouraged hydration.   Current physical limitations: -    Psychosocial Assessment   Support systems: significant other friend(s) relative(s)--girlfriend had sleeve sx  Socioeconomic factors: mom does most of the cooking and food shopping, but eats at girlfriend's more often who had sx.     Nutrition Diagnosis  Diagnosis: Overweight / Obesity (NC-3.3)  Related to: Physical inactivity and Excessive energy intake  As Evidenced by: BMI >25     Nutrition Prescription: Recommend the following diet  Regular    Interventions and Teaching   Discussed pre-op and post-op nutrition guidelines.       Patient educated and handouts provided.  Surgical changes to stomach / GI  Capacity of post-surgery stomach  Diet progression  Adequate hydration  Sugar and fat restriction to decrease \"dumping syndrome\"  Fat restriction to decrease steatorrhea  Expected weight loss  Weight loss plateaus/ possibility of weight regain  Exercise  Suggestions for pre-op diet  Nutrition considerations after surgery  Protein supplements  Meal planning and preparation  Appropriate carbohydrate, protein, and fat intake, and food/fluid choices to maximize safe weight loss, nutrient intake, and tolerance   Dietary and lifestyle changes  Possible problems with poor eating habits  Intuitive eating  Techniques for self monitoring and keeping daily food journal  Potential for food intolerance after surgery, and ways to deal with them including: lactose " intolerance, nausea, reflux, vomiting, diarrhea, food intolerance, appetite changes, gas  Vitamin / Mineral supplementation of Multivitamin with minerals and Vitamin D    Education provided to: patient    Barriers to learning: No barriers identified    Readiness to change: preparation    Prior research on procedure: books, internet, discussed with provider, and friends or family    Comprehension: verbalizes understanding     Expected Compliance: good    Recommendations  Pt is an appropriate candidate for surgery. Yes    Evaluation / Monitoring  Dietitian to Monitor: Eating pattern as discussed Tolerance of nutrition prescription Body weight Lab values Physical activity    Pre-op weight goals:  Do NOT Gain  Can go to BMI of 40:   325#  Encouraged weight loss w/ diet and lifestyle changes  Will be started on a 2 week liver shrinking diet, possibly shorter/longer as per the discretion of the surgeon/team, directly prior to surgery    Workflow: (Incomplete in Bold):  Psych and/or D+A Clearance: n/a  Blood Work: completed 1/24/25--acceptable  PCP letter: completed  Surgeon Appt: completed--nan on 12/16/24  EGD: completed on 2/21  Cardiac Risk Assessment with ECG: completed on 2/19/25  Sleep Studies:   had consult on 2/4 and now home sleep study on 3/26   Nicotine test: n/a  Pre-Operative Program: 12 wt checks--11 of 12 wt check today    Goals  continue to avoid sugar sweetened beverages  64oz + calorie free fluids  Food journal via [employer]  Continue workout regimen--get back to the gym after being away from is this past week, continue the addition of walking the dog  Complete lesson plans 1-6  Eat 3 meals per day with protein at each meal  Use protein shake as meal replacement or snack as needed--mostly overnight at work  Continue to avoid the mindless snacking  Did provide with samples of vitamins for post-op at previous RD appt  Reviewed pre-op diet (1 month prior to sx:  2 weeks modified, 2 weeks full liver  shrinking diet)    Time Spent:   30 mins

## 2025-03-26 ENCOUNTER — HOSPITAL ENCOUNTER (OUTPATIENT)
Dept: SLEEP CENTER | Facility: CLINIC | Age: 25
Discharge: HOME | End: 2025-03-26
Payer: COMMERCIAL

## 2025-03-26 DIAGNOSIS — E66.01 OBESITY, CLASS III, BMI 40-49.9 (MORBID OBESITY) (HCC): ICD-10-CM

## 2025-03-26 DIAGNOSIS — R06.83 SNORING: ICD-10-CM

## 2025-03-26 PROCEDURE — G0399 HOME SLEEP TEST/TYPE 3 PORTA: HCPCS

## 2025-03-26 NOTE — PROGRESS NOTES
Home Sleep Study Documentation    HOME STUDY DEVICE: Noxturnal no                                           Iwona G3 yes device # 20      Pre-Sleep Home Study:    Set-up and instructions performed by: Florecita    Technician performed demonstration for Patient: yes    Return demonstration performed by Patient: yes    Written instructions provided to Patient: yes    Patient signed consent form: yes        Post-Sleep Home Study:    Additional comments by Patient: pending    Home Sleep Study Failed:pending    Failure reason: pending    Reported or Detected: pending    Scored by: pending

## 2025-03-31 ENCOUNTER — CLINICAL SUPPORT (OUTPATIENT)
Dept: BARIATRICS | Facility: CLINIC | Age: 25
End: 2025-03-31

## 2025-03-31 VITALS — BODY MASS INDEX: 57.67 KG/M2 | WEIGHT: 315 LBS

## 2025-03-31 DIAGNOSIS — E66.01 MORBID (SEVERE) OBESITY DUE TO EXCESS CALORIES (HCC): Primary | ICD-10-CM

## 2025-03-31 PROCEDURE — RECHECK

## 2025-03-31 NOTE — PROGRESS NOTES
Patient presents for 12 of 12 weight check, current weight 467.6lbs.    Eating behaviors/food choices: patient continues healthy eating habits focused on protein and nutrients. He is working on managing his portions, talking with family members who plate his food to keep the portions smaller. He was happily surprised by his continued weight loss, feels the steps he took to cut sugary drinks and snacks out of his diet were a real success for him. He feels the changes he's made he can continue for longer term weight and health management.    Activity/Exercise:  Patient is active three days per week, goes to the gym for 2hrs after work. He takes his dog out for walks 2-3x per day, sometimes they are quick and other times he spends more time out walking and playing with his dog.    Sleep/Rest:  Patient reports no issues with sleep, he had his home study on 3/26 but doesn't feel it went well. His dog was more active that night and he struggled to sleep. Explained provider is seeking out apnea episodes that can happen if it's a good or not so good nights rest. Waiting on results in order to submit to insurance.    Mental Health/Wellness:  Patient denies any mood changes, no stress. He reports everything is going well, he remains excited for surgery, no hesitation or worries about proceeding. Patient feels he has learned a lot since his time in the program. He reports when he first started he thought weight loss would be really hard, the changes he would have to make would be difficult but is happy that he made the changes needed with relative ease and feels confident he can continue to be successful.    Workflow review:    - waiting on home sleep study results     Goals:    - continue healthy meal structure, monitoring portions  - continue efforts to be active and to get quality sleep  - seek out support as needed for behavior and lifestyle changes.    Next Appointment:  with RD on on 4/24

## 2025-04-04 PROBLEM — G47.33 OSA (OBSTRUCTIVE SLEEP APNEA): Status: ACTIVE | Noted: 2025-04-04

## 2025-04-05 DIAGNOSIS — G47.33 OSA (OBSTRUCTIVE SLEEP APNEA): Primary | ICD-10-CM

## 2025-04-05 DIAGNOSIS — R35.1 NOCTURIA: ICD-10-CM

## 2025-04-05 DIAGNOSIS — E66.01 OBESITY, CLASS III, BMI 40-49.9 (MORBID OBESITY) (HCC): ICD-10-CM

## 2025-04-05 PROBLEM — R06.83 SNORING: Status: ACTIVE | Noted: 2025-04-05

## 2025-04-05 PROBLEM — G47.34 NOCTURNAL HYPOXIA: Status: ACTIVE | Noted: 2025-04-05

## 2025-04-05 NOTE — PROGRESS NOTES
Patient recently completed a home sleep study which revealed severe obstructive sleep apnea with an SALVATORE of 40 events per hour.  Significant hypoxia was also present as the patient spent greater than 60 minutes with SpO2 under 90%.    Due to severity of sleep disordered breathing I have placed an order for a CPAP titration study.    I have messaged the clinical sleep pool to contact the patient regarding sleep study results and schedule CPAP study as above.

## 2025-04-07 ENCOUNTER — TELEPHONE (OUTPATIENT)
Dept: SLEEP CENTER | Facility: CLINIC | Age: 25
End: 2025-04-07

## 2025-04-07 NOTE — TELEPHONE ENCOUNTER
Home study resulted, confirms severe CHIP (SALVATORE-40.6) with event related desaturations present and significant hypoxemia noted.    CPAP titration study recommended.    Call placed to patient, advised of above.    Scheduled CPAP titration 4/22/2025 in [place].

## 2025-04-07 NOTE — TELEPHONE ENCOUNTER
----- Message from Octaviano Collins MD sent at 4/5/2025  8:47 AM EDT -----  Regarding: HST Results - Severe CHIP with Hypoxia  [facility] [place],     Patient recently had a home sleep study which revealed severe obstructive sleep apnea with an SALVATORE of 40 events per hour.  Significant hypoxia was present as the patient spent 65 minutes with SpO2 under 90%.    I have placed an order for a CPAP titration study.    Please contact patient regarding sleep study results and plan of care as above.    Let me know if there are any questions or concerns, thanks!    Nilesh

## 2025-04-22 ENCOUNTER — HOSPITAL ENCOUNTER (OUTPATIENT)
Dept: SLEEP CENTER | Facility: CLINIC | Age: 25
Discharge: HOME | End: 2025-04-22
Payer: COMMERCIAL

## 2025-04-22 DIAGNOSIS — G47.33 OSA (OBSTRUCTIVE SLEEP APNEA): ICD-10-CM

## 2025-04-22 DIAGNOSIS — E66.813 OBESITY, CLASS III, BMI 40-49.9 (MORBID OBESITY): ICD-10-CM

## 2025-04-22 DIAGNOSIS — R35.1 NOCTURIA: ICD-10-CM

## 2025-04-22 PROCEDURE — 95811 POLYSOM 6/>YRS CPAP 4/> PARM: CPT | Performed by: STUDENT IN AN ORGANIZED HEALTH CARE EDUCATION/TRAINING PROGRAM

## 2025-04-22 PROCEDURE — 95811 POLYSOM 6/>YRS CPAP 4/> PARM: CPT

## 2025-04-22 NOTE — PROGRESS NOTES
Sleep Study Documentation    Pre-Sleep Study       Sleep testing procedure explained to patient:YES    Patient napped prior to study:NO    Caffeine:Nightshift worker after midnight.  Caffeine use:NO    Alcohol:Nightshift workers after 7AM: Alcohol use:NO    Typical day for patient:YES       Study Documentation    Sleep Study Indications: Patient here for CPAP titration. HST performed with impressions of CHIP    Sleep Study: Treatment   Optimal PAP pressure: 15cm H2O  Leak:Large towards last REM period.  Snore:Eliminated  REM Obtained:yes  Supplemental O2: no    Minimum SaO2 86.0%  Baseline SaO2 97.0%  PAP mask tried (list all)Res Med F-20   PAP mask choice (final)Res Med F-20  PAP mask type:full face  PAP pressure at which snoring was eliminated 15cm H2O  Minimum SaO2 at final PAP pressure 86.0%   Mode of Therapy:CPAP  ETCO2:No  CPAP changed to BiPAP:No    Mode of Therapy:CPAP    EKG abnormalities: no    EEG abnormalities: yes:  EPOCH example and comments see Epoch 351 and 976 for examples.    Were abnormal behaviors in sleep observed:NO    Is Total Sleep Study Recording Time < 2 hours: N/A    Is Total Sleep Study Recording Time > 2 hours but study is incomplete: N/A    Is Total Sleep Study Recording Time 6 hours or more but sleep was not obtained: NO    Patient classification: employed       Post-Sleep Study    Medication used at bedtime or during sleep study:YES over the counter sleep aid    Patient reports time it took to fall asleep:less than 20 minutes    Patient reports waking up during study:1 to 2 times.  Patient reports returning to sleep without difficulty.    Patient reports sleeping 4 to 6 hours with dreaming.    Does the Patient feel this is a typical night of sleep:better than usual    Patient rated sleepiness: Not sleepy or tired    PAP treatment:yes: Post PAP treatment patient reports feeling better and  would wear PAP mask at home.

## 2025-04-24 ENCOUNTER — DOCUMENTATION (OUTPATIENT)
Dept: SLEEP CENTER | Facility: CLINIC | Age: 25
End: 2025-04-24

## 2025-04-24 ENCOUNTER — CLINICAL SUPPORT (OUTPATIENT)
Dept: BARIATRICS | Facility: CLINIC | Age: 25
End: 2025-04-24

## 2025-04-24 VITALS — HEIGHT: 76 IN | WEIGHT: 315 LBS | BODY MASS INDEX: 38.36 KG/M2

## 2025-04-24 DIAGNOSIS — E66.01 MORBID (SEVERE) OBESITY DUE TO EXCESS CALORIES (HCC): Primary | ICD-10-CM

## 2025-04-24 DIAGNOSIS — R35.1 NOCTURIA: ICD-10-CM

## 2025-04-24 DIAGNOSIS — E66.813 OBESITY, CLASS III, BMI 40-49.9 (MORBID OBESITY): ICD-10-CM

## 2025-04-24 DIAGNOSIS — G47.33 OSA (OBSTRUCTIVE SLEEP APNEA): Primary | ICD-10-CM

## 2025-04-24 PROCEDURE — RECHECK

## 2025-04-24 NOTE — PROGRESS NOTES
"Bariatric Nutrition F/U Note    Type of surgery    Preop (12 wt check)--12 of 12 wt check--completed on 3/31/25  Surgery Date: TBD--pt is leaning toward the RYGB  Surgeon: Daryl Surgeons: Dr. Nava     Nutrition Assessment   Vimal Solorio  25 y.o.  male     Wt with BMI of 25: 203.2#  Pre-Op Excess Wt: 323#  Ht 6' 3.5\" (1.918 m)   Wt (!) 212 kg (467 lb 12.8 oz)   BMI 57.70 kg/m²     Start weight: 526# (1216/24)  Net weight change: -58.2#    [place]- St. Jeor Equation:    IEC=[id number]  Weight Maintenance 4083  Estimated calories for weight loss 0340-4533 ( 1-2# per wk wt loss - sedentary )  Estimated protein needs 92-138g (1.0-1.5 gms/kg IBW )   Estimated fluid needs 3220 ml(30-35 ml/kg IBW )      Weight History   Onset of Obesity: Childhood--when was in HS and wasn't planing sports and now poor eating schedule  Family history of obesity: No  Wt Loss Attempts: Exercise  Self Created Diets (Portion Control, Healthy Food Choices, etc.)  Juicing  Patient has tried the above for 6 months or more with insufficient weight loss or weight regain, which is why patient has requested to be evaluated for weight loss surgery today  Maximum Wt Lost: -20#    Review of History and Medications   Past Medical History:   Diagnosis Date    No pertinent past medical history      Past Surgical History:   Procedure Laterality Date    WISDOM TOOTH EXTRACTION Bilateral     5525-4617     Social History     Socioeconomic History    Marital status: Single     Spouse name: Not on file    Number of children: Not on file    Years of education: Not on file    Highest education level: Not on file   Occupational History    Not on file   Tobacco Use    Smoking status: Never    Smokeless tobacco: Never   Vaping Use    Vaping status: Never Used   Substance and Sexual Activity    Alcohol use: Not Currently    Drug use: Not on file    Sexual activity: Not on file   Other Topics Concern    Not on file   Social History Narrative    Not on file     Social " "Drivers of Health     Financial Resource Strain: Not on file   Food Insecurity: Not on file   Transportation Needs: Not on file   Physical Activity: Not on file   Stress: Not on file   Social Connections: Not on file   Intimate Partner Violence: Not on file   Housing Stability: Not on file     No current outpatient medications on file.    Food Intake and Lifestyle Assessment   Food Intake Assessment completed via usual diet recall    Works 11p to 7am  Breakfast: 8-9am: Protein shake in the morning  Plays video games/watch movie  S: Greek yogurt w/berries at times OR PB and apple  L: 1-2pm-salad (veggies) w/salmon instead of chicken, cheese, a few croutons w/Indian dressing (measures 1-2 tbsp)  Sleeps from 6pm until about 10pm  3pm: when wakes up will take the dog for a walk and might have a small snack ie: banana  7:30-8pm--will wake up to eat then back to sleep: salmon with rice/mashed potatoes + broccoli/green beans--was getting tired of the chicken  Overnight at work 2-3am: protein shake     \"Days off\" Sunday and Monday--catching up on sleep so skipping more meals.   Wakes up later between 9-10am   Similar food choices/meals, but could be more skipping of meals.    Beverage intake: water, juice, regular soda, and alcohol  Protein supplement: not at this time  Estimated protein intake per day: 75-95gm  Estimated fluid intake per day: 80oz water, no more juice or soda  Meals eaten away from home: no more take out, a lot more home cooked  Typical meal pattern: 2 meals per day and little snacks per day  Eating Behaviors: Consumption of high calorie/ high fat foods, Consumption of high calorie beverages, and Large portion sizes  Food allergies or intolerances: No Known Allergies  Cultural or Mu-ism considerations: -    Physical Assessment  Physical Activity  Types of exercise: gym and walks the dog  Usually regimen is:  Sunday, Monday, Tuesday--cardio + most day will also add in strength--arms, legs, abs --one on " each day, arms, chest, back another day.  Ends workout w/15mins of treadmill w/4 incline and 2.0 speed. Does use a sauna suite--encouraged hydration.   Current physical limitations: -    Psychosocial Assessment   Support systems: significant other friend(s) relative(s)--girlfriend had sleeve sx  Socioeconomic factors: mom does most of the cooking and food shopping, but eats at girlfriend's more often who had sx.     Nutrition Diagnosis  Diagnosis: Overweight / Obesity (NC-3.3)  Related to: Physical inactivity and Excessive energy intake  As Evidenced by: BMI >25     Nutrition Prescription: Recommend the following diet  Regular    Interventions and Teaching   Discussed pre-op and post-op nutrition guidelines.       Patient educated and handouts provided.  Surgical changes to stomach / GI  Capacity of post-surgery stomach  Diet progression  Adequate hydration  Fat restriction to decrease steatorrhea  Expected weight loss  Weight loss plateaus/ possibility of weight regain  Exercise  Suggestions for pre-op diet  Nutrition considerations after surgery  Protein supplements  Meal planning and preparation  Appropriate carbohydrate, protein, and fat intake, and food/fluid choices to maximize safe weight loss, nutrient intake, and tolerance   Dietary and lifestyle changes  Possible problems with poor eating habits  Intuitive eating  Techniques for self monitoring and keeping daily food journal  Potential for food intolerance after surgery, and ways to deal with them including: lactose intolerance, nausea, reflux, vomiting, diarrhea, food intolerance, appetite changes, gas  Vitamin / Mineral supplementation of Multivitamin with minerals and Vitamin D    Education provided to: patient    Barriers to learning: No barriers identified    Readiness to change: preparation    Prior research on procedure: books, internet, discussed with provider, and friends or family    Comprehension: verbalizes understanding     Expected  Compliance: good    Recommendations  Pt is an appropriate candidate for surgery. Yes    Evaluation / Monitoring  Dietitian to Monitor: Eating pattern as discussed Tolerance of nutrition prescription Body weight Lab values Physical activity    Pre-op weight goals:  Do NOT Gain  Can go to BMI of 40:   325#  Encouraged weight loss w/ diet and lifestyle changes  Will be started on a 2 week liver shrinking diet, possibly shorter/longer as per the discretion of the surgeon/team, directly prior to surgery    Workflow: (Incomplete in Bold):  Psych and/or D+A Clearance: n/a  Blood Work: completed 1/24/25--acceptable  PCP letter: completed  Surgeon Appt: completed--nan on 12/16/24  EGD: completed on 2/21  Cardiac Risk Assessment with ECG: completed on 2/19/25  Sleep Studies:  had in lab sleep study on 4/22--getting cpap soon.  Nicotine test: n/a  Pre-Operative Program: 12 wt checks--12 of 12 wt check complete don 3/31    Pt presents today for pre-op check in as he completed his 12 wt checks on 3/31. Pt has done well maintaining his weight with an overall weight loss of 58#.  He continues to eat three meals per day, exercise and limit snacking. Today we reviewed the post-op diet progression and provided with handout for reference.  Pt verbalizes understanding and excited about next steps. Pt ready to be submitted for insurance auth, but aware sx needs to be at least 30 days after her starts wearing his CPAP.  Pt remains a good candidate for bariatric sx.     Goals  continue to avoid sugar sweetened beverages  64oz + calorie free fluids  Food journal via [employer]  Continue workout regimen + continue the addition of walking the dog  Complete lesson plans 1-6  Eat 3 meals per day with protein at each meal  Use protein shake as meal replacement or snack as needed--mostly overnight at work  Continue to avoid the mindless snacking  Decide which vitamin and calcium will like use post-op  Reviewed  post-op diet  progression    Time Spent:   30 mins

## 2025-04-24 NOTE — PROGRESS NOTES
Patient recently completed a CPAP titration sleep study which revealed appropriate treatment of sleep disordered breathing with CPAP therapy.    Auto CPAP order has been placed.    I have messaged the clinical sleep pool to contact the patient regarding sleep study results and schedule office follow-up in 3 months for initial CPAP compliance visit.

## 2025-04-25 ENCOUNTER — TELEPHONE (OUTPATIENT)
Dept: SLEEP CENTER | Facility: CLINIC | Age: 25
End: 2025-04-25

## 2025-04-25 NOTE — TELEPHONE ENCOUNTER
CPAP titration study resulted and shows effective PAP titration resulting in minimization of respiratory events. CPAP ordered.     Results read by patient.    Called patient and left message advising I will send a readfyt message with the sleep study results and next steps.  Provided sleep center number([phone]) to call if any questions.

## 2025-04-28 ENCOUNTER — TELEPHONE (OUTPATIENT)
Dept: SLEEP CENTER | Facility: CLINIC | Age: 25
End: 2025-04-28

## 2025-05-01 LAB

## 2025-05-02 ENCOUNTER — TELEPHONE (OUTPATIENT)
Age: 25
End: 2025-05-02

## 2025-05-05 NOTE — TELEPHONE ENCOUNTER
Patient called again returning missed call and from Friday. I do not see any notes as far as who called, he states he believes the voicemail said Heidy but he is not sure. Patient can be reached at [phone].

## 2025-05-06 ENCOUNTER — PREP FOR PROCEDURE (OUTPATIENT)
Dept: BARIATRICS | Facility: CLINIC | Age: 25
End: 2025-05-06

## 2025-05-06 DIAGNOSIS — E66.01 MORBID OBESITY (HCC): Primary | ICD-10-CM

## 2025-05-08 LAB

## 2025-05-19 ENCOUNTER — CLINICAL SUPPORT (OUTPATIENT)
Dept: BARIATRICS | Facility: CLINIC | Age: 25
End: 2025-05-19

## 2025-05-19 VITALS — BODY MASS INDEX: 55.92 KG/M2 | WEIGHT: 315 LBS

## 2025-05-19 DIAGNOSIS — E66.01 MORBID (SEVERE) OBESITY DUE TO EXCESS CALORIES (HCC): Primary | ICD-10-CM

## 2025-05-19 PROCEDURE — RECHECK

## 2025-05-19 NOTE — PROGRESS NOTES
Patient presents for pre-op check in, current weight 453.4lbs.    Eating behaviors/food choices: Patient gets three meals a day with his work schedule, staying protein focused. Still bringing protein shake with him to work, trying out varieties and the ones he likes. He is considering getting protein powders, discussed various ways to use them.    Activity/Exercise:  Patient is taking his dog on longer walks a few times a week, going on trails and to the dog park. When he's not going for walks he's using hand weights that he can change the weights on. He is trying to tone extra skin he is already noticing.    Sleep/Rest:  Patient got sleep study done and will be getting CPAP machine within the next week or so. He is excited to starting using the machine as he is already seeing the benefit in his sleep.    Mental Health/Wellness:  Patient denies any mood changes, some minor stress with trying to train his dog. He feels he's managing it well, he has good supports. He is excited for surgery and feels ready to progress. Per Dr. Nava, he wants patient to lose additional 25lbs from his weight on 4/24/25 which was 467.8lbs, 442.8lbs is goal weight. Additional visits scheduled to do those weight checks, patient feels confident in current regiment he will meet that goal.    Workflow review:  Patient scheduled for surgery on 6/25    Goals:    - continue mindful eating regiment, experiment with protein powder options  - continue efforts to be active  - check on self care and stress management    Next Appointment:  with TENZIN on 5/27

## 2025-05-22 LAB

## 2025-05-27 ENCOUNTER — TELEPHONE (OUTPATIENT)
Dept: BARIATRICS | Facility: CLINIC | Age: 25
End: 2025-05-27

## 2025-05-27 NOTE — TELEPHONE ENCOUNTER
Spoke with patient regarding weight check today, clarifying that he seems to have met the surgeon requested weight loss so today's visit isn't necessary unless he wants to check in. Patient reports he's doing well, no need to check in, asked questions about [place] paperwork and given direction to follow up with his HR office. Patient's visit with SW next week still scheduled.

## 2025-06-02 ENCOUNTER — CLINICAL SUPPORT (OUTPATIENT)
Dept: BARIATRICS | Facility: CLINIC | Age: 25
End: 2025-06-02

## 2025-06-02 VITALS — BODY MASS INDEX: 56.27 KG/M2 | WEIGHT: 315 LBS

## 2025-06-02 DIAGNOSIS — E66.01 MORBID (SEVERE) OBESITY DUE TO EXCESS CALORIES (HCC): Primary | ICD-10-CM

## 2025-06-02 PROCEDURE — RECHECK

## 2025-06-02 NOTE — PROGRESS NOTES
Patient presents for pre-op check in, current weight 456.2lbs.    Patient reports a slight weight gain due to enjoying some foods he won't be able to have a for a while, if at all, post-op. He reports having spaghetti one night, empenadas another night and going to a BBQ. He reports the food was all home cooked which he enjoys more than take out. Discussed food tolerance after surgery, the judgements we may be putting on the food which choose which may influence our portions and how to create balance in our nutrition. Patient feels really confident in his eating regiment, he feels he has learned a lot and is proud of the 70lbs he has lost so far. Patient is preparing for surgery this month, has gone over the pre-op diet, prepared for weight loss that will occur with that diet, suggested patient make a list of vegetables he can enjoy during pre-op diet. Discussed the possibility of grieving food and social interactions with food post-op. He is preparing for vacations this summer after surgery, reviewed precautions he needs to take with his incision sights and avoiding weighted exercises until cleared by surgeon.    Workflow review:  patient scheduled for surgery 6/25    Next Appointment:  pre-op class and final H&P on 6/6

## 2025-06-06 ENCOUNTER — CLINICAL SUPPORT (OUTPATIENT)
Dept: BARIATRICS | Facility: CLINIC | Age: 25
End: 2025-06-06

## 2025-06-06 ENCOUNTER — OFFICE VISIT (OUTPATIENT)
Dept: BARIATRICS | Facility: CLINIC | Age: 25
End: 2025-06-06
Payer: COMMERCIAL

## 2025-06-06 VITALS
SYSTOLIC BLOOD PRESSURE: 138 MMHG | DIASTOLIC BLOOD PRESSURE: 84 MMHG | HEIGHT: 76 IN | HEART RATE: 75 BPM | WEIGHT: 315 LBS | BODY MASS INDEX: 38.36 KG/M2

## 2025-06-06 DIAGNOSIS — E66.01 MORBID (SEVERE) OBESITY DUE TO EXCESS CALORIES (HCC): Primary | ICD-10-CM

## 2025-06-06 DIAGNOSIS — Z01.818 PREOP EXAMINATION: Primary | ICD-10-CM

## 2025-06-06 PROCEDURE — 99213 OFFICE O/P EST LOW 20 MIN: CPT | Performed by: STUDENT IN AN ORGANIZED HEALTH CARE EDUCATION/TRAINING PROGRAM

## 2025-06-06 PROCEDURE — RECHECK

## 2025-06-06 RX ORDER — CELECOXIB 200 MG/1
200 CAPSULE ORAL ONCE
OUTPATIENT
Start: 2025-06-06 | End: 2025-06-06

## 2025-06-06 RX ORDER — ACETAMINOPHEN 10 MG/ML
1000 INJECTION, SOLUTION INTRAVENOUS ONCE
OUTPATIENT
Start: 2025-06-06 | End: 2025-06-06

## 2025-06-06 RX ORDER — HEPARIN SODIUM 5000 [USP'U]/ML
5000 INJECTION, SOLUTION INTRAVENOUS; SUBCUTANEOUS ONCE
OUTPATIENT
Start: 2025-06-06 | End: 2025-06-06

## 2025-06-06 NOTE — H&P (VIEW-ONLY)
[facility] Operating Room  [address] [address]  [phone]        DATE OF SERVICE:  2/21/25     PHYSICIAN(S):  Attending:   Marycruz Platt MD      Fellow:   No Staff Documented         INDICATION:  Morbid obesity (HCC)     POST-OP DIAGNOSIS:  See the impression below.     PREPROCEDURE:  Informed consent was obtained for the procedure, including sedation.  Risks of perforation, hemorrhage, adverse drug reaction and aspiration were discussed. The patient was placed in the left lateral decubitus position.     Patient was explained about the risks and benefits of the procedure. Risks including but not limited to bleeding, infection, and perforation were explained in detail. Also explained about less than 100% sensitivity with the exam and other alternatives.     PROCEDURE: EGD     DETAILS OF PROCEDURE:   Patient was taken to the procedure room where a time out was performed to confirm correct patient and correct procedure. The patient underwent monitored anesthesia care, which was administered by an anesthesia professional. The patient's blood pressure, heart rate, level of consciousness, respirations, oxygen, ECG and ETCO2 were monitored throughout the procedure. The scope was introduced through the mouth and advanced to the second part of the duodenum. Retroflexion was performed in the fundus. The patient experienced no blood loss. The procedure was not difficult. The patient tolerated the procedure well. There were no apparent adverse events.      ANESTHESIA INFORMATION:  ASA: III  Anesthesia Type: IV Sedation with Anesthesia     MEDICATIONS:  No administrations occurring from 0852 to 0905 on 02/21/25         FINDINGS:  All observed locations appeared normal, including the esophagus, stomach and duodenum.  Performed multiple forceps biopsies in the antrum to rule out H. pylori  Regular Z-line 40 cm from the incisors        SPECIMENS:  ID Type Source Tests Collected by Time  "Destination   1 : Antrum Bx - R/o H. Pylori Tissue Stomach TISSUE EXAM Marycruz Platt MD 2/21/2025 0904              IMPRESSION:  Normal.  Performed forceps biopsies in the antrum to rule out H. pylori        RECOMMENDATION:  Continue progress    BARIATRIC HISTORY AND PHYSICAL - BARIATRIC SURGERY  Vimal Solorio 25 y.o. male MRN: [id number]  Unit/Bed#:  Encounter: [id number]      HPI:  Vimal Solorio is a 25 y.o. male who presents to review their preoperative workup and see if they are a good candidate to undergo a bariatric and/or foregut procedure.     Review of Systems   Constitutional:  Negative for chills and fever.   HENT:  Negative for ear pain and sore throat.    Eyes:  Negative for pain and visual disturbance.   Respiratory:  Negative for cough and shortness of breath.    Cardiovascular:  Negative for chest pain and palpitations.   Gastrointestinal:  Negative for abdominal pain and vomiting.   Genitourinary:  Negative for dysuria and hematuria.   Musculoskeletal:  Negative for arthralgias and back pain.   Skin:  Negative for color change and rash.   Neurological:  Negative for seizures and syncope.   All other systems reviewed and are negative.      Historical Information   Past Medical History[1]  Past Surgical History[2]  Social History   Social History     Substance and Sexual Activity   Alcohol Use Not Currently     Social History     Substance and Sexual Activity   Drug Use Not on file     Tobacco Use History[3]  Family History: Family history non-contributory    Meds/Allergies   all medications and allergies reviewed  Allergies[4]    Objective     Current Vitals:   Blood Pressure: 138/84 (06/06/25 1011)  Pulse: 75 (06/06/25 1011)  Height: 6' 4\" (193 cm) (06/06/25 1011)  Weight - Scale: (!) 207 kg (455 lb 9.6 oz) (06/06/25 1011)  bowel movement  [unfilled]    Invasive Devices       None                   Physical Exam  Constitutional:       Appearance: Normal appearance.   HENT:      Head: Normocephalic.     "  Nose: Nose normal.     Eyes:      Extraocular Movements: Extraocular movements intact.       Cardiovascular:      Rate and Rhythm: Normal rate.   Pulmonary:      Effort: Pulmonary effort is normal.   Abdominal:      General: Abdomen is flat.      Palpations: Abdomen is soft.     Musculoskeletal:         General: Normal range of motion.     Skin:     General: Skin is warm.     Psychiatric:         Mood and Affect: Mood normal.         Behavior: Behavior normal.         Lab Results: I have personally reviewed pertinent lab results.    Imaging: Results Review Statement: No pertinent imaging studies reviewed.  EKG, Pathology, and Other Studies: Results Review Statement: No pertinent imaging studies reviewed.    Code Status: [unfilled]  Advance Directive and Living Will:      Power of :    POLST:        Assessment/Plan:    The patient presented to review the preoperative workup and see if bariatric surgery is appropriate and indicated following the extensive preoperative workup and the enrollment in our weight loss program.  Preoperative workup was complete. Results were reviewed with the patient including the blood work results and the endoscopy findings and the biopsy results. We also reviewed the cardiology evaluation.    The patient was determined to be a good candidate for RYGB.  ----------------------------------------------------------------------  Smoking: No  Blood thinner use: No  Home pain medication use and who manages it: No  CPAP use: Yes (If yes, sleep study obtained and reminded pt to bring CPAP to hospital)  History of blood clots: No  Previous abdominal surgeries: No  Cardiac clearance obtained: Yes   EKG performed: Yes  EGD prior to surgery: Yes  Screening Labs obtained (CBC, CMP): CBC pending  H. Pylori status: A. Stomach, Antrum, Biopsy:  - Reactive antral gastritis/gastropathy.  - Negative for intestinal metaplasia or dysplasia.  - No Helicobacter pylori is identified on H&E stained  slides.   Medication allergies: No  Reminded patient about 2 week pre-op liquid diet:     -----------------------------------------------------------------------  Risks and benefits explained one more time to the patient. Alternatives to surgery and alternative forms of surgery were also explained. Post-surgical commitment and after care programs were explained.  Consent was signed. Questions were answered and concerns were addressed. Patient will need to start the 2 week liquid diet prior to surgery.  Patient wishes to proceed. As per [facility] guidelines, I had a discussion with the patient regarding their CODE STATUS in the perioperative period and the patient is level 1 or FULL CODE STATUS.    Deion Adam MD  Bariatric Surgery   6/6/2025  10:28 AM         [1]   Past Medical History:  Diagnosis Date    No pertinent past medical history    [2]   Past Surgical History:  Procedure Laterality Date    WISDOM TOOTH EXTRACTION Bilateral     [id number]   [3]   Social History  Tobacco Use   Smoking Status Never   Smokeless Tobacco Never   [4] No Known Allergies

## 2025-06-06 NOTE — PROGRESS NOTES
[facility] Operating Room  [address] [address]  [phone]        DATE OF SERVICE:  2/21/25     PHYSICIAN(S):  Attending:   Marycruz Platt MD      Fellow:   No Staff Documented         INDICATION:  Morbid obesity (HCC)     POST-OP DIAGNOSIS:  See the impression below.     PREPROCEDURE:  Informed consent was obtained for the procedure, including sedation.  Risks of perforation, hemorrhage, adverse drug reaction and aspiration were discussed. The patient was placed in the left lateral decubitus position.     Patient was explained about the risks and benefits of the procedure. Risks including but not limited to bleeding, infection, and perforation were explained in detail. Also explained about less than 100% sensitivity with the exam and other alternatives.     PROCEDURE: EGD     DETAILS OF PROCEDURE:   Patient was taken to the procedure room where a time out was performed to confirm correct patient and correct procedure. The patient underwent monitored anesthesia care, which was administered by an anesthesia professional. The patient's blood pressure, heart rate, level of consciousness, respirations, oxygen, ECG and ETCO2 were monitored throughout the procedure. The scope was introduced through the mouth and advanced to the second part of the duodenum. Retroflexion was performed in the fundus. The patient experienced no blood loss. The procedure was not difficult. The patient tolerated the procedure well. There were no apparent adverse events.      ANESTHESIA INFORMATION:  ASA: III  Anesthesia Type: IV Sedation with Anesthesia     MEDICATIONS:  No administrations occurring from 0852 to 0905 on 02/21/25         FINDINGS:  All observed locations appeared normal, including the esophagus, stomach and duodenum.  Performed multiple forceps biopsies in the antrum to rule out H. pylori  Regular Z-line 40 cm from the incisors        SPECIMENS:  ID Type Source Tests Collected by Time  "Destination   1 : Antrum Bx - R/o H. Pylori Tissue Stomach TISSUE EXAM Marycruz Platt MD 2/21/2025 0904              IMPRESSION:  Normal.  Performed forceps biopsies in the antrum to rule out H. pylori        RECOMMENDATION:  Continue progress    BARIATRIC HISTORY AND PHYSICAL - BARIATRIC SURGERY  Vimal Solorio 25 y.o. male MRN: [id number]  Unit/Bed#:  Encounter: [id number]      HPI:  Vimal Solorio is a 25 y.o. male who presents to review their preoperative workup and see if they are a good candidate to undergo a bariatric and/or foregut procedure.     Review of Systems   Constitutional:  Negative for chills and fever.   HENT:  Negative for ear pain and sore throat.    Eyes:  Negative for pain and visual disturbance.   Respiratory:  Negative for cough and shortness of breath.    Cardiovascular:  Negative for chest pain and palpitations.   Gastrointestinal:  Negative for abdominal pain and vomiting.   Genitourinary:  Negative for dysuria and hematuria.   Musculoskeletal:  Negative for arthralgias and back pain.   Skin:  Negative for color change and rash.   Neurological:  Negative for seizures and syncope.   All other systems reviewed and are negative.      Historical Information   Past Medical History[1]  Past Surgical History[2]  Social History   Social History     Substance and Sexual Activity   Alcohol Use Not Currently     Social History     Substance and Sexual Activity   Drug Use Not on file     Tobacco Use History[3]  Family History: Family history non-contributory    Meds/Allergies   all medications and allergies reviewed  Allergies[4]    Objective     Current Vitals:   Blood Pressure: 138/84 (06/06/25 1011)  Pulse: 75 (06/06/25 1011)  Height: 6' 4\" (193 cm) (06/06/25 1011)  Weight - Scale: (!) 207 kg (455 lb 9.6 oz) (06/06/25 1011)  bowel movement  [unfilled]    Invasive Devices       None                   Physical Exam  Constitutional:       Appearance: Normal appearance.   HENT:      Head: Normocephalic.     "  Nose: Nose normal.     Eyes:      Extraocular Movements: Extraocular movements intact.       Cardiovascular:      Rate and Rhythm: Normal rate.   Pulmonary:      Effort: Pulmonary effort is normal.   Abdominal:      General: Abdomen is flat.      Palpations: Abdomen is soft.     Musculoskeletal:         General: Normal range of motion.     Skin:     General: Skin is warm.     Psychiatric:         Mood and Affect: Mood normal.         Behavior: Behavior normal.         Lab Results: I have personally reviewed pertinent lab results.    Imaging: Results Review Statement: No pertinent imaging studies reviewed.  EKG, Pathology, and Other Studies: Results Review Statement: No pertinent imaging studies reviewed.    Code Status: [unfilled]  Advance Directive and Living Will:      Power of :    POLST:        Assessment/Plan:    The patient presented to review the preoperative workup and see if bariatric surgery is appropriate and indicated following the extensive preoperative workup and the enrollment in our weight loss program.  Preoperative workup was complete. Results were reviewed with the patient including the blood work results and the endoscopy findings and the biopsy results. We also reviewed the cardiology evaluation.    The patient was determined to be a good candidate for RYGB.  ----------------------------------------------------------------------  Smoking: No  Blood thinner use: No  Home pain medication use and who manages it: No  CPAP use: Yes (If yes, sleep study obtained and reminded pt to bring CPAP to hospital)  History of blood clots: No  Previous abdominal surgeries: No  Cardiac clearance obtained: Yes   EKG performed: Yes  EGD prior to surgery: Yes  Screening Labs obtained (CBC, CMP): CBC pending  H. Pylori status: A. Stomach, Antrum, Biopsy:  - Reactive antral gastritis/gastropathy.  - Negative for intestinal metaplasia or dysplasia.  - No Helicobacter pylori is identified on H&E stained  slides.   Medication allergies: No  Reminded patient about 2 week pre-op liquid diet:     -----------------------------------------------------------------------  Risks and benefits explained one more time to the patient. Alternatives to surgery and alternative forms of surgery were also explained. Post-surgical commitment and after care programs were explained.  Consent was signed. Questions were answered and concerns were addressed. Patient will need to start the 2 week liquid diet prior to surgery.  Patient wishes to proceed. As per [facility] guidelines, I had a discussion with the patient regarding their CODE STATUS in the perioperative period and the patient is level 1 or FULL CODE STATUS.    Deion Adam MD  Bariatric Surgery   6/6/2025  10:28 AM         [1]   Past Medical History:  Diagnosis Date    No pertinent past medical history    [2]   Past Surgical History:  Procedure Laterality Date    WISDOM TOOTH EXTRACTION Bilateral     [id number]   [3]   Social History  Tobacco Use   Smoking Status Never   Smokeless Tobacco Never   [4] No Known Allergies

## 2025-06-06 NOTE — PROGRESS NOTES
Pt attended pre-op education session. Standardized packet of information for bariatric surgery was provided and reviewed with pt. Importance of lifestyle change and development of regular exercise routine stressed.   Pt. educated on two-week pre operative liquid protein liver shrinking diet.  Pt understands that the diet needs to be followed for 2 weeks prior to surgery. Handout reviewed.   Emphasized the need to drink 80 ounces of fluid per day while on the diet and to contact PCP to adjust any diabetes or blood pressure medicines prior to starting the diet.  Patient will not eat any solid food for 2 days prior to surgery, including 2 cups of non starchy vegetables to ensure that the stomach will be empty day of surgery. Reviewed Ensure pre-surgery ERAS drink instructions, protein supplement suggestions, post-operative clear liquid, full liquid, and pureed diet stages, post-operative nutrition rules and facts, and post-operative bariatric multivitamin/mineral recommendations and brand comparison. Reviewed instructions for stopping or tapering anti-obesity medications prior to surgery.      Pt given the opportunity to ask questions. Questions were answered. Pt verbalized understanding of all information provided. Pt appeared prepared for upcoming surgery. Contact information provided for any questions/concerns.      Pt has already lost 70lbs during the pre-op process. Advised to start full liver shrinking diet on June 11th for June 25th surgery.

## 2025-06-11 RX ORDER — OMEPRAZOLE 20 MG/1
20 CAPSULE, DELAYED RELEASE ORAL DAILY
Qty: 90 CAPSULE | Refills: 1 | Status: SHIPPED | OUTPATIENT
Start: 2025-06-11

## 2025-06-17 NOTE — PRE-PROCEDURE INSTRUCTIONS
Pre-Surgery Instructions:   Medication Instructions    omeprazole (PriLOSEC) 20 mg delayed release capsule Instructions provided by MD    Card drink instructions reviewed. Medication instructions for day of surgery reviewed. Please take all instructed medications with only a sip of water. Please do not take any over the counter (non-prescribed) vitamins or supplements for one week prior to date of surgery.      You will receive a call one business day prior to surgery with an arrival time and hospital directions. If your surgery is scheduled on a Monday, the hospital will be calling you on the Friday prior to your surgery. If you have not heard from anyone by 8pm, please call the hospital supervisor through the hospital  at [phone]. ([place] [phone] or [place] [phone]).    Do not eat or drink anything after midnight the night before your surgery, including candy, mints, lifesavers, or chewing gum. Do not drink alcohol 24hrs before your surgery. Try not to smoke at least 24hrs before your surgery.       Follow the pre surgery showering instructions as listed in the “My Surgical Experience Booklet” or otherwise provided by your surgeon's office. Do not use a blade to shave the surgical area 1 week before surgery. It is okay to use a clean electric clippers up to 24 hours before surgery. Do not apply any lotions, creams, including makeup, cologne, deodorant, or perfumes after showering on the day of your surgery. Do not use dry shampoo, hair spray, hair gel, or any type of hair products.     No contact lenses, eye make-up, or artificial eyelashes. Remove nail polish, including gel polish, and any artificial, gel, or acrylic nails if possible. Remove all jewelry including rings and body piercing jewelry.     Wear causal clothing that is easy to take on and off. Consider your type of surgery.    Keep any valuables, jewelry, piercings at home. Please bring any specially ordered equipment (sling,  braces) if indicated.    Arrange for a responsible person to drive you to and from the hospital on the day of your surgery. Please confirm the visitor policy for the day of your procedure when you receive your phone call with an arrival time.     Call the surgeon's office with any new illnesses, exposures, or additional questions prior to surgery.    Please reference your “My Surgical Experience Booklet” for additional information to prepare for your upcoming surgery.

## 2025-06-25 ENCOUNTER — HOSPITAL ENCOUNTER (OUTPATIENT)
Facility: HOSPITAL | Age: 25
Setting detail: OUTPATIENT SURGERY
Discharge: HOME | End: 2025-06-26
Attending: SURGERY | Admitting: SURGERY
Payer: COMMERCIAL

## 2025-06-25 ENCOUNTER — ANESTHESIA EVENT (OUTPATIENT)
Dept: PERIOP | Facility: HOSPITAL | Age: 25
End: 2025-06-25
Payer: COMMERCIAL

## 2025-06-25 ENCOUNTER — ANESTHESIA (OUTPATIENT)
Dept: PERIOP | Facility: HOSPITAL | Age: 25
End: 2025-06-25
Payer: COMMERCIAL

## 2025-06-25 LAB — GLUCOSE SERPL-MCNC: 128 MG/DL (ref 65–140)

## 2025-06-25 PROCEDURE — 43644 LAP GASTRIC BYPASS/ROUX-EN-Y: CPT | Performed by: SURGERY

## 2025-06-25 PROCEDURE — 43644 LAP GASTRIC BYPASS/ROUX-EN-Y: CPT | Performed by: STUDENT IN AN ORGANIZED HEALTH CARE EDUCATION/TRAINING PROGRAM

## 2025-06-25 PROCEDURE — 82948 REAGENT STRIP/BLOOD GLUCOSE: CPT

## 2025-06-25 PROCEDURE — C1781 MESH (IMPLANTABLE): HCPCS | Performed by: SURGERY

## 2025-06-25 DEVICE — SEAMGUARD STPL REINF ENDO GIA ULTRA UNIV 60 PURPLE: Type: IMPLANTABLE DEVICE | Site: ABDOMEN | Status: FUNCTIONAL

## 2025-06-25 RX ORDER — OXYCODONE HCL 5 MG/5 ML
5 SOLUTION, ORAL ORAL EVERY 4 HOURS PRN
Status: DISCONTINUED | OUTPATIENT
Start: 2025-06-25 | End: 2025-06-26 | Stop reason: HOSPADM

## 2025-06-25 RX ORDER — LABETALOL HYDROCHLORIDE 5 MG/ML
20 INJECTION, SOLUTION INTRAVENOUS EVERY 6 HOURS PRN
Status: DISCONTINUED | OUTPATIENT
Start: 2025-06-25 | End: 2025-06-26 | Stop reason: HOSPADM

## 2025-06-25 RX ORDER — CEFAZOLIN SODIUM 3 G/50ML
3000 SOLUTION INTRAVENOUS ONCE
Status: COMPLETED | OUTPATIENT
Start: 2025-06-25 | End: 2025-06-25

## 2025-06-25 RX ORDER — DIPHENHYDRAMINE HCL 25 MG
25 TABLET ORAL EVERY 8 HOURS PRN
Status: DISCONTINUED | OUTPATIENT
Start: 2025-06-25 | End: 2025-06-26 | Stop reason: HOSPADM

## 2025-06-25 RX ORDER — HYDROMORPHONE HCL/PF 1 MG/ML
0.5 SYRINGE (ML) INJECTION
Status: DISCONTINUED | OUTPATIENT
Start: 2025-06-25 | End: 2025-06-25 | Stop reason: HOSPADM

## 2025-06-25 RX ORDER — ACETAMINOPHEN 10 MG/ML
1000 INJECTION, SOLUTION INTRAVENOUS ONCE
Status: COMPLETED | OUTPATIENT
Start: 2025-06-25 | End: 2025-06-25

## 2025-06-25 RX ORDER — SIMETHICONE 80 MG
80 TABLET,CHEWABLE ORAL 4 TIMES DAILY PRN
Status: DISCONTINUED | OUTPATIENT
Start: 2025-06-25 | End: 2025-06-26 | Stop reason: HOSPADM

## 2025-06-25 RX ORDER — ONDANSETRON 2 MG/ML
4 INJECTION INTRAMUSCULAR; INTRAVENOUS EVERY 6 HOURS PRN
Status: DISCONTINUED | OUTPATIENT
Start: 2025-06-25 | End: 2025-06-26 | Stop reason: HOSPADM

## 2025-06-25 RX ORDER — MAGNESIUM HYDROXIDE 1200 MG/15ML
LIQUID ORAL AS NEEDED
Status: DISCONTINUED | OUTPATIENT
Start: 2025-06-25 | End: 2025-06-25 | Stop reason: HOSPADM

## 2025-06-25 RX ORDER — METRONIDAZOLE 500 MG/100ML
500 INJECTION, SOLUTION INTRAVENOUS ONCE
Status: DISCONTINUED | OUTPATIENT
Start: 2025-06-25 | End: 2025-06-25 | Stop reason: HOSPADM

## 2025-06-25 RX ORDER — ONDANSETRON 2 MG/ML
4 INJECTION INTRAMUSCULAR; INTRAVENOUS ONCE AS NEEDED
Status: DISCONTINUED | OUTPATIENT
Start: 2025-06-25 | End: 2025-06-25 | Stop reason: HOSPADM

## 2025-06-25 RX ORDER — CEFAZOLIN SODIUM 3 G/50ML
3000 SOLUTION INTRAVENOUS EVERY 8 HOURS
Status: COMPLETED | OUTPATIENT
Start: 2025-06-25 | End: 2025-06-26

## 2025-06-25 RX ORDER — GLYCOPYRROLATE 0.2 MG/ML
INJECTION INTRAMUSCULAR; INTRAVENOUS AS NEEDED
Status: DISCONTINUED | OUTPATIENT
Start: 2025-06-25 | End: 2025-06-25

## 2025-06-25 RX ORDER — FENTANYL CITRATE 50 UG/ML
INJECTION, SOLUTION INTRAMUSCULAR; INTRAVENOUS AS NEEDED
Status: DISCONTINUED | OUTPATIENT
Start: 2025-06-25 | End: 2025-06-25

## 2025-06-25 RX ORDER — PROMETHAZINE HYDROCHLORIDE 25 MG/ML
25 INJECTION, SOLUTION INTRAMUSCULAR; INTRAVENOUS EVERY 6 HOURS PRN
Status: DISCONTINUED | OUTPATIENT
Start: 2025-06-25 | End: 2025-06-26

## 2025-06-25 RX ORDER — ACETAMINOPHEN 10 MG/ML
1000 INJECTION, SOLUTION INTRAVENOUS EVERY 6 HOURS SCHEDULED
Status: DISCONTINUED | OUTPATIENT
Start: 2025-06-25 | End: 2025-06-26 | Stop reason: HOSPADM

## 2025-06-25 RX ORDER — PHENYLEPHRINE HCL IN 0.9% NACL 1 MG/10 ML
SYRINGE (ML) INTRAVENOUS AS NEEDED
Status: DISCONTINUED | OUTPATIENT
Start: 2025-06-25 | End: 2025-06-25

## 2025-06-25 RX ORDER — PANTOPRAZOLE SODIUM 40 MG/10ML
40 INJECTION, POWDER, LYOPHILIZED, FOR SOLUTION INTRAVENOUS
Status: DISCONTINUED | OUTPATIENT
Start: 2025-06-26 | End: 2025-06-26 | Stop reason: HOSPADM

## 2025-06-25 RX ORDER — CELECOXIB 100 MG/1
200 CAPSULE ORAL ONCE
Status: COMPLETED | OUTPATIENT
Start: 2025-06-25 | End: 2025-06-25

## 2025-06-25 RX ORDER — HYDROMORPHONE HCL/PF 1 MG/ML
SYRINGE (ML) INJECTION AS NEEDED
Status: DISCONTINUED | OUTPATIENT
Start: 2025-06-25 | End: 2025-06-25

## 2025-06-25 RX ORDER — MIDAZOLAM HYDROCHLORIDE 2 MG/2ML
INJECTION, SOLUTION INTRAMUSCULAR; INTRAVENOUS AS NEEDED
Status: DISCONTINUED | OUTPATIENT
Start: 2025-06-25 | End: 2025-06-25

## 2025-06-25 RX ORDER — METRONIDAZOLE 500 MG/100ML
INJECTION, SOLUTION INTRAVENOUS CONTINUOUS PRN
Status: DISCONTINUED | OUTPATIENT
Start: 2025-06-25 | End: 2025-06-25

## 2025-06-25 RX ORDER — METOCLOPRAMIDE HYDROCHLORIDE 5 MG/ML
10 INJECTION INTRAMUSCULAR; INTRAVENOUS ONCE AS NEEDED
Status: DISCONTINUED | OUTPATIENT
Start: 2025-06-25 | End: 2025-06-25 | Stop reason: HOSPADM

## 2025-06-25 RX ORDER — DEXAMETHASONE SODIUM PHOSPHATE 10 MG/ML
INJECTION, SOLUTION INTRAMUSCULAR; INTRAVENOUS AS NEEDED
Status: DISCONTINUED | OUTPATIENT
Start: 2025-06-25 | End: 2025-06-25

## 2025-06-25 RX ORDER — FENTANYL CITRATE/PF 50 MCG/ML
50 SYRINGE (ML) INJECTION
Status: DISCONTINUED | OUTPATIENT
Start: 2025-06-25 | End: 2025-06-25 | Stop reason: HOSPADM

## 2025-06-25 RX ORDER — LIDOCAINE HYDROCHLORIDE 20 MG/ML
INJECTION, SOLUTION EPIDURAL; INFILTRATION; INTRACAUDAL; PERINEURAL AS NEEDED
Status: DISCONTINUED | OUTPATIENT
Start: 2025-06-25 | End: 2025-06-25

## 2025-06-25 RX ORDER — ROCURONIUM BROMIDE 10 MG/ML
INJECTION, SOLUTION INTRAVENOUS AS NEEDED
Status: DISCONTINUED | OUTPATIENT
Start: 2025-06-25 | End: 2025-06-25

## 2025-06-25 RX ORDER — HEPARIN SODIUM 5000 [USP'U]/ML
5000 INJECTION, SOLUTION INTRAVENOUS; SUBCUTANEOUS ONCE
Status: COMPLETED | OUTPATIENT
Start: 2025-06-25 | End: 2025-06-25

## 2025-06-25 RX ORDER — BUPIVACAINE HYDROCHLORIDE 5 MG/ML
INJECTION, SOLUTION EPIDURAL; INTRACAUDAL; PERINEURAL AS NEEDED
Status: DISCONTINUED | OUTPATIENT
Start: 2025-06-25 | End: 2025-06-25 | Stop reason: HOSPADM

## 2025-06-25 RX ORDER — FAMOTIDINE 10 MG/ML
20 INJECTION, SOLUTION INTRAVENOUS 2 TIMES DAILY
Status: DISCONTINUED | OUTPATIENT
Start: 2025-06-25 | End: 2025-06-25

## 2025-06-25 RX ORDER — MORPHINE SULFATE 4 MG/ML
4 INJECTION, SOLUTION INTRAMUSCULAR; INTRAVENOUS EVERY 4 HOURS PRN
Status: DISCONTINUED | OUTPATIENT
Start: 2025-06-25 | End: 2025-06-26 | Stop reason: HOSPADM

## 2025-06-25 RX ORDER — PROPOFOL 10 MG/ML
INJECTION, EMULSION INTRAVENOUS CONTINUOUS PRN
Status: DISCONTINUED | OUTPATIENT
Start: 2025-06-25 | End: 2025-06-25

## 2025-06-25 RX ORDER — SUCCINYLCHOLINE/SOD CL,ISO/PF 100 MG/5ML
SYRINGE (ML) INTRAVENOUS AS NEEDED
Status: DISCONTINUED | OUTPATIENT
Start: 2025-06-25 | End: 2025-06-25

## 2025-06-25 RX ORDER — METRONIDAZOLE 500 MG/100ML
500 INJECTION, SOLUTION INTRAVENOUS EVERY 8 HOURS
Status: COMPLETED | OUTPATIENT
Start: 2025-06-25 | End: 2025-06-26

## 2025-06-25 RX ORDER — NALOXONE HYDROCHLORIDE 0.4 MG/ML
INJECTION, SOLUTION INTRAMUSCULAR; INTRAVENOUS; SUBCUTANEOUS AS NEEDED
Status: DISCONTINUED | OUTPATIENT
Start: 2025-06-25 | End: 2025-06-25

## 2025-06-25 RX ORDER — OXYCODONE HCL 5 MG/5 ML
10 SOLUTION, ORAL ORAL EVERY 4 HOURS PRN
Status: DISCONTINUED | OUTPATIENT
Start: 2025-06-25 | End: 2025-06-26 | Stop reason: HOSPADM

## 2025-06-25 RX ORDER — SODIUM CHLORIDE, SODIUM LACTATE, POTASSIUM CHLORIDE, CALCIUM CHLORIDE 600; 310; 30; 20 MG/100ML; MG/100ML; MG/100ML; MG/100ML
100 INJECTION, SOLUTION INTRAVENOUS CONTINUOUS
Status: DISCONTINUED | OUTPATIENT
Start: 2025-06-25 | End: 2025-06-26 | Stop reason: HOSPADM

## 2025-06-25 RX ORDER — ONDANSETRON 2 MG/ML
INJECTION INTRAMUSCULAR; INTRAVENOUS AS NEEDED
Status: DISCONTINUED | OUTPATIENT
Start: 2025-06-25 | End: 2025-06-25

## 2025-06-25 RX ORDER — PROPOFOL 10 MG/ML
INJECTION, EMULSION INTRAVENOUS AS NEEDED
Status: DISCONTINUED | OUTPATIENT
Start: 2025-06-25 | End: 2025-06-25

## 2025-06-25 RX ORDER — SODIUM CHLORIDE, SODIUM LACTATE, POTASSIUM CHLORIDE, CALCIUM CHLORIDE 600; 310; 30; 20 MG/100ML; MG/100ML; MG/100ML; MG/100ML
INJECTION, SOLUTION INTRAVENOUS CONTINUOUS PRN
Status: DISCONTINUED | OUTPATIENT
Start: 2025-06-25 | End: 2025-06-25

## 2025-06-25 RX ADMIN — SODIUM CHLORIDE, SODIUM LACTATE, POTASSIUM CHLORIDE, AND CALCIUM CHLORIDE: .6; .31; .03; .02 INJECTION, SOLUTION INTRAVENOUS at 13:35

## 2025-06-25 RX ADMIN — ROCURONIUM 10 MG: 50 INJECTION, SOLUTION INTRAVENOUS at 13:41

## 2025-06-25 RX ADMIN — SODIUM CHLORIDE, SODIUM LACTATE, POTASSIUM CHLORIDE, AND CALCIUM CHLORIDE: .6; .31; .03; .02 INJECTION, SOLUTION INTRAVENOUS at 15:38

## 2025-06-25 RX ADMIN — Medication 100 MCG: at 15:49

## 2025-06-25 RX ADMIN — HEPARIN SODIUM 5000 UNITS: 5000 INJECTION INTRAVENOUS; SUBCUTANEOUS at 12:10

## 2025-06-25 RX ADMIN — HYDROMORPHONE HYDROCHLORIDE 0.5 MG: 1 INJECTION, SOLUTION INTRAMUSCULAR; INTRAVENOUS; SUBCUTANEOUS at 14:14

## 2025-06-25 RX ADMIN — PROPOFOL 200 MG: 10 INJECTION, EMULSION INTRAVENOUS at 13:41

## 2025-06-25 RX ADMIN — FOSAPREPITANT 150 MG: 150 INJECTION, POWDER, LYOPHILIZED, FOR SOLUTION INTRAVENOUS at 12:06

## 2025-06-25 RX ADMIN — ACETAMINOPHEN 1000 MG: 10 INJECTION INTRAVENOUS at 12:46

## 2025-06-25 RX ADMIN — ACETAMINOPHEN 1000 MG: 10 INJECTION INTRAVENOUS at 23:06

## 2025-06-25 RX ADMIN — GLYCOPYRROLATE 0.2 MG: 0.2 INJECTION, SOLUTION INTRAMUSCULAR; INTRAVENOUS at 13:47

## 2025-06-25 RX ADMIN — ROCURONIUM 30 MG: 50 INJECTION, SOLUTION INTRAVENOUS at 14:49

## 2025-06-25 RX ADMIN — SIMETHICONE 80 MG: 80 TABLET, CHEWABLE ORAL at 23:06

## 2025-06-25 RX ADMIN — PROPOFOL 75 MCG/KG/MIN: 10 INJECTION, EMULSION INTRAVENOUS at 13:44

## 2025-06-25 RX ADMIN — CELECOXIB 200 MG: 100 CAPSULE ORAL at 12:10

## 2025-06-25 RX ADMIN — MIDAZOLAM 2 MG: 1 INJECTION INTRAMUSCULAR; INTRAVENOUS at 13:35

## 2025-06-25 RX ADMIN — LIDOCAINE HYDROCHLORIDE 100 MG: 20 INJECTION, SOLUTION EPIDURAL; INFILTRATION; INTRACAUDAL; PERINEURAL at 13:41

## 2025-06-25 RX ADMIN — METRONIDAZOLE 500 MG: 500 INJECTION, SOLUTION INTRAVENOUS at 22:22

## 2025-06-25 RX ADMIN — CEFAZOLIN SODIUM 3000 MG: 3 SOLUTION INTRAVENOUS at 21:03

## 2025-06-25 RX ADMIN — METRONIDAZOLE: 500 INJECTION, SOLUTION INTRAVENOUS at 13:50

## 2025-06-25 RX ADMIN — ROCURONIUM 20 MG: 50 INJECTION, SOLUTION INTRAVENOUS at 14:27

## 2025-06-25 RX ADMIN — HYDROMORPHONE HYDROCHLORIDE 0.5 MG: 1 INJECTION, SOLUTION INTRAMUSCULAR; INTRAVENOUS; SUBCUTANEOUS at 15:34

## 2025-06-25 RX ADMIN — NALOXONE HYDROCHLORIDE 0.08 MG: 0.4 INJECTION, SOLUTION INTRAMUSCULAR; INTRAVENOUS; SUBCUTANEOUS at 17:07

## 2025-06-25 RX ADMIN — ROCURONIUM 40 MG: 50 INJECTION, SOLUTION INTRAVENOUS at 13:49

## 2025-06-25 RX ADMIN — Medication 50 MCG: at 15:37

## 2025-06-25 RX ADMIN — ACETAMINOPHEN 1000 MG: 10 INJECTION INTRAVENOUS at 18:48

## 2025-06-25 RX ADMIN — DEXAMETHASONE SODIUM PHOSPHATE 10 MG: 10 INJECTION, SOLUTION INTRAMUSCULAR; INTRAVENOUS at 13:43

## 2025-06-25 RX ADMIN — ROCURONIUM 5 MG: 50 INJECTION, SOLUTION INTRAVENOUS at 15:27

## 2025-06-25 RX ADMIN — Medication 100 MG: at 13:41

## 2025-06-25 RX ADMIN — FENTANYL CITRATE 100 MCG: 50 INJECTION, SOLUTION INTRAMUSCULAR; INTRAVENOUS at 13:41

## 2025-06-25 RX ADMIN — SODIUM CHLORIDE, SODIUM LACTATE, POTASSIUM CHLORIDE, AND CALCIUM CHLORIDE 100 ML/HR: .6; .31; .03; .02 INJECTION, SOLUTION INTRAVENOUS at 18:49

## 2025-06-25 RX ADMIN — ONDANSETRON 4 MG: 2 INJECTION INTRAMUSCULAR; INTRAVENOUS at 13:43

## 2025-06-25 RX ADMIN — DEXMEDETOMIDINE 0.1 MCG/KG/HR: 100 INJECTION, SOLUTION, CONCENTRATE INTRAVENOUS at 13:44

## 2025-06-25 RX ADMIN — SUGAMMADEX 400 MG: 100 INJECTION, SOLUTION INTRAVENOUS at 16:16

## 2025-06-25 RX ADMIN — HYDROMORPHONE HYDROCHLORIDE 0.5 MG: 1 INJECTION, SOLUTION INTRAMUSCULAR; INTRAVENOUS; SUBCUTANEOUS at 14:27

## 2025-06-25 RX ADMIN — NALOXONE HYDROCHLORIDE 0.04 MG: 0.4 INJECTION, SOLUTION INTRAMUSCULAR; INTRAVENOUS; SUBCUTANEOUS at 16:59

## 2025-06-25 RX ADMIN — CEFAZOLIN SODIUM 3000 MG: 3 SOLUTION INTRAVENOUS at 13:46

## 2025-06-25 RX ADMIN — NALOXONE HYDROCHLORIDE 0.04 MG: 0.4 INJECTION, SOLUTION INTRAMUSCULAR; INTRAVENOUS; SUBCUTANEOUS at 17:27

## 2025-06-25 NOTE — OP NOTE
[facility]   [address] [place] [address], [facility] [address] [facility], [address] [phone] (Fax)      Operative Report  BYPASS GASTRIC  TINA-EN-Y LAPAROSCOPIC AND INTRAOPERATIVE EGD     Patient Name: Vimal Solorio    :  2000  MRN: [id number]  Patient Location: [facility] 02  Surgery Date : 2025  Surgeons:  Surgeons and Role:     * Hu Nava MD - Primary     * Tang Judd MD - Assisting    Diagnosis:    Pre-Op Diagnosis Codes:  Morbid obesity (HCC) [E66.01]  Body mass index is 54.23 kg/m².      Post-Op Diagnosis Codes:     * Morbid obesity (HCC) [E66.01]     * Body mass index is 54.23 kg/m².      Procedure  Laparoscopic Tina-en-Y Gastric Bypass  Intraoperative Endoscopy    Specimen(s):  * No specimens in log *    Estimated Blood Loss:    50 mL     Anesthesia Type:     General    Operative Indications:    Morbid obesity (HCC) [E66.01]  Body mass index is 54.23 kg/m².      Operative Findings:    Enlarged liver    Complications:     None    Procedure and Technique:    INDICATION:    Vimal Solorio is a 25 y.o. male with a Body mass index is 54.23 kg/m². and a long standing history of morbid obesity and inability to lose a significant amount of weight on its own.  This patient was found to be a good candidate to undergo a bariatric procedure upon being enrolled here at the [facility].    OPERATIVE TECHNIQUE    The patient was taken to the operating room and placed in a supine position. A dose of IV antibiotic prophylaxis that consisted of Ancef 3g and Metronidazole 500mg were given. Also, 5000 units of subcutaneous unfractionated heparin to prevent DVT were administered. Sequential compression devices were placed on both lower extremities.    After satisfactory general anesthesia induction and endotracheal intubation was achieved, the extremities were secured to prevent neurovascular and musculoskeletal injuries as best as possible. Subsequently, the  abdominal wall was prepped and draped in a surgical standard sterile fashion.  After a timeout was done and the patient was properly identified and the type of procedure was confirmed a supra-umbilical transverse skin incision was made, and the subcutaneous tissues dissected. Access to the peritoneal cavity was gained with an optical trocar. With this device, we were able to visualize the layers of the abdominal wall, and enter the peritoneal cavity under direct visualization. Pneumoperitoneum was then established with CO2 insufflation.     A four quadrant transversus abdominis plane block was performed under direct laparoscopic vision.  After this was completed four additional trocars were placed: a 12 mm in the right upper quadrant subcostal position in the anterior axillary line, a 12-mm port was placed in the right flank midclavicular line, a 12-mm port was placed in the left upper quadrant subcostal position in the midclavicular line and another 12-mm port was placed in the left quadrant anterior axillary line lateral to the supraumbilical port.    With the trocars in place, the dissection was begun.    The omentum of the transverse colon was identified and elevated, this allowed for the ligament of Treitz to be visualized.  The small bowel was run about 60 cm to a point distal from the ligament of Treitz and was divided with a stapler and a 60 mm cartridge. The Naveen limb was then measured at 100 cm, and the 100 cm julius was brought in side-to-side opposition to the biliopancreatic limb. A side-to-side jejunojejunostomy was then created. This was accomplished by first making an antimesenteric enterotomy with cautery energy device. We then positioned the laparoscopic stapler with a 60-mm cartridge within the lumen of the bowel to create a stapled side-to-side anastomosis. The enterotomy was then approximated with a 2-0 silk suture, subsequently elevated and the stapler was then reloaded and positioned  perpendicularly to the first staple lines, just below the margin of the enterotomy on the antimesenteric border of the bowel and closed transversely utilizing an additional 60-mm cartridge. The resulting mesenteric defect was then closed with a running nonabsorbable suture. A Brolin stitch was placed to prevent kinking.    At this point we repositioned the patient into a reverse Trendelenburg and the Li liver retractor was placed in the subxiphoid position through the use of a 5-mm trocar incision.  The left lobe of the liver was significantly enlarged and limited our view partially.  I had to readjust the Li retractor a couple times.    We then turned our attention to the gastroesophageal junction.  The left rosi was skeletonized dissecting at the angle of His.  The pars flaccida was identified and incised. The lesser sac was entered below the lesser curve at the level just inferior to the take off of the left gastric artery.  The left gastric artery and hepatic vagal branches were preserved.     We then created a 30 cc gastric pouch. To accomplish this, serial firings of a laparoscopic stapler 60-mm cartridge were utilized. The staple lines were reinforced with a butressing material. We created a pouch based on the lesser curve and in vertical orientation. This was accomplished by a  transverse firing of the stapler along the inferior edge of the pouch and then vertical serial firings of the stapler to the angle of His. This completely  the pouch from the gastric remnant.  There was an area on the left rosi of the diaphragm close to the angle of Hiss that was oozing and this was controlled with the energy dissector.  Upon suctioning the clot, the proximal staple line of the pouch and the gastric remnant were oozing and this was controlled with a surgical clip applier.     A 25 mm circular stapler anvil was passed through the mouth and into the esophagus and subsequently placed within the  pouch.  The inferior edge of the pouch was then opened with cautery and the anvil stem was brought through the anterior aspect of the pouch close to the staple line. We proceeded to divide the omentum all the way to the transverse colon. The end of the Naveen limb was opened with the cautery and the circular stapling device was brought through the dilated left upper quadrant 12-mm port site, and passed through the open end of the Naveen limb. The Naveen limb was then passed in a antecolic and antegastric position to the pouch. This was accomplished without tension and without twist.  The stem of the stapling device was then brought through the antimesenteric border of the Naveen limb adjacent to the pouch. The stem and the anvil were united and the stapler was fired. This created an end-to-side gastrojejunostomy. The excess Naveen limb proximal to the anastomosis was then resected with the cautery energy device and a laparoscopic stapler with a 60-mm cartridge.   The anastomosis was reinforced with interrupted absorbable sutures at the intersection of the staple lines. The distal Naveen limb was occluded and an EGD as well as an air insufflation test were performed. No intraoperative bleeding nor leaks were detected.     I then sprayed  the G-J anastomosis and the left rosi area next to the Angle of His with Vistaseal.    The sponge, needle and instrument count was reported complete.    The 12-mm port site on the left flank that was dilated for the circular stapler as well as the Visiport trocar were then closed with the use of a suture closure device and a 0 absorbable suture. The liver retractor was removed under direct laparoscopic visualization, and no bleeding was noted.   The remaining ports were then also removed under laparoscopic visualization. The skin incisions were all closed with 4-0 absorbable subcuticular suture. The patient tolerated the procedure well, was extubated uneventfully and was transferred to the  recovery room in stable condition.    I was present for the entire length of the procedure as the attending of record.  No qualified resident was available to assist.  The presence of an assistant was necessary for camera holding, traction and counter traction and for help with suturing and stapling in addition to performing the intraop-EGD.    Patient Disposition:    PACU     Signature: Hu Nava MD  Date: June 25, 2025  Time: 4:19 PM

## 2025-06-25 NOTE — ANESTHESIA POSTPROCEDURE EVALUATION
Post-Op Assessment Note    CV Status:  Stable  Pain Score: 0    Pain management: adequate       Mental Status:  Sleepy   Hydration Status:  Euvolemic   PONV Controlled:  Controlled   Airway Patency:  Patent     Post Op Vitals Reviewed: Yes    No anethesia notable event occurred.    Staff: Anesthesiologist, CRNA           Last Filed PACU Vitals:  Vitals Value Taken Time   Temp 97.8 °F (36.6 °C) 06/25/25 16:54   Pulse 85 06/25/25 16:54   /72 06/25/25 16:54   Resp 12 06/25/25 16:54   SpO2 94 % 06/25/25 16:54

## 2025-06-25 NOTE — PLAN OF CARE
Problem: PAIN - ADULT  Goal: Verbalizes/displays adequate comfort level or baseline comfort level  Description: Interventions:  - Encourage patient to monitor pain and request assistance  - Assess pain using appropriate pain scale  - Administer analgesics as ordered based on type and severity of pain and evaluate response  - Implement non-pharmacological measures as appropriate and evaluate response  - Consider cultural and social influences on pain and pain management  - Notify physician/advanced practitioner if interventions unsuccessful or patient reports new pain  - Educate patient/family on pain management process including their role and importance of  reporting pain   - Provide non-pharmacologic/complimentary pain relief interventions  6/25/2025 1915 by Chelly Lopes RN  Outcome: Progressing  6/25/2025 1914 by Chelly Lopes RN  Outcome: Progressing

## 2025-06-25 NOTE — ANESTHESIA PREPROCEDURE EVALUATION
Procedure:  BYPASS GASTRIC  TINA-EN-Y LAPAROSCOPIC AND INTRAOPERATIVE EGD (Abdomen)    Relevant Problems   PULMONARY   (+) CHIP (obstructive sleep apnea)      Other   (+) Class 3 severe obesity with body mass index (BMI) of 50.0 to 59.9 in adult        Physical Exam    Airway     Mallampati score: III  TM Distance: >3 FB  Neck ROM: full      Cardiovascular  Cardiovascular exam normal    Dental   Comment: Denies loose teeth     Pulmonary  Pulmonary exam normal     Neurological    He appears awake, alert and oriented x3.      Other Findings        Anesthesia Plan  ASA Score- 3     Anesthesia Type- general with ASA Monitors.         Additional Monitors:     Airway Plan: Oral ETT.           Plan Factors-Exercise tolerance (METS): >4 METS.    Chart reviewed.   Existing labs reviewed. Patient summary reviewed.    Patient is not a current smoker.              Induction- intravenous.    Postoperative Plan- .   Monitoring Plan - Monitoring plan - standard ASA monitoring          Informed Consent- Anesthetic plan and risks discussed with patient.  I personally reviewed this patient with the CRNA. Discussed and agreed on the Anesthesia Plan with the CRNA..      NPO Status:  Vitals Value Taken Time   Date of last liquid 06/25/25 06/25/25 11:34   Time of last liquid 1000 06/25/25 11:34   Date of last solid 06/21/25 06/25/25 11:34   Time of last solid 2100 06/25/25 11:34     CPAP brought from home

## 2025-06-25 NOTE — INTERVAL H&P NOTE
H&P reviewed. After examining the patient I find no changes in the patients condition since the H&P had been written.    Vitals:    06/25/25 1150   BP: 140/64   Pulse: 92   Resp: 18   Temp: 97.7 °F (36.5 °C)   SpO2: 99%

## 2025-06-25 NOTE — PROGRESS NOTES
Patient alert and awake, incisions clean, dry and intact, vital signs WNL. Patient transferred to APU via stretcher. Patient ambulated to the recliner per protocol. Report given to GEM Spaulding and LIZ Bustillo . Chair locked, side rails up, call bell within reach.    Detail Level: Detailed Quality 137: Melanoma: Continuity Of Care - Recall System: Patient information entered into a recall system that includes: target date for the next exam specified AND a process to follow up with patients regarding missed or unscheduled appointments Detail Level: Generalized Quality 224: Stage 0-Iic Melanoma: Overutilization Of Imaging Studies For Only Stage 0-Iic Melanoma: None of the following diagnostic imaging studies ordered: chest X-ray, CT, Ultrasound, MRI, PET, or nuclear medicine scans (ML)

## 2025-06-26 VITALS
TEMPERATURE: 99 F | RESPIRATION RATE: 28 BRPM | HEART RATE: 76 BPM | WEIGHT: 315 LBS | DIASTOLIC BLOOD PRESSURE: 89 MMHG | OXYGEN SATURATION: 97 % | HEIGHT: 75 IN | SYSTOLIC BLOOD PRESSURE: 158 MMHG | BODY MASS INDEX: 39.17 KG/M2

## 2025-06-26 LAB
ANION GAP SERPL CALCULATED.3IONS-SCNC: 14 MMOL/L (ref 4–13)
BUN SERPL-MCNC: 5 MG/DL (ref 5–25)
CALCIUM SERPL-MCNC: 9.5 MG/DL (ref 8.4–10.2)
CHLORIDE SERPL-SCNC: 101 MMOL/L (ref 96–108)
CO2 SERPL-SCNC: 21 MMOL/L (ref 21–32)
CREAT SERPL-MCNC: 0.85 MG/DL (ref 0.6–1.3)
ERYTHROCYTE [DISTWIDTH] IN BLOOD BY AUTOMATED COUNT: 13.5 % (ref 11.6–15.1)
GFR SERPL CREATININE-BSD FRML MDRD: 122 ML/MIN/1.73SQ M
GLUCOSE SERPL-MCNC: 121 MG/DL (ref 65–140)
HCT VFR BLD AUTO: 44.8 % (ref 36.5–49.3)
HGB BLD-MCNC: 14.8 G/DL (ref 12–17)
MCH RBC QN AUTO: 26.9 PG (ref 26.8–34.3)
MCHC RBC AUTO-ENTMCNC: 33 G/DL (ref 31.4–37.4)
MCV RBC AUTO: 82 FL (ref 82–98)
PLATELET # BLD AUTO: 314 THOUSANDS/UL (ref 149–390)
PMV BLD AUTO: 9.6 FL (ref 8.9–12.7)
POTASSIUM SERPL-SCNC: 4 MMOL/L (ref 3.5–5.3)
RBC # BLD AUTO: 5.5 MILLION/UL (ref 3.88–5.62)
SODIUM SERPL-SCNC: 136 MMOL/L (ref 135–147)
WBC # BLD AUTO: 12.87 THOUSAND/UL (ref 4.31–10.16)

## 2025-06-26 PROCEDURE — 85027 COMPLETE CBC AUTOMATED: CPT | Performed by: STUDENT IN AN ORGANIZED HEALTH CARE EDUCATION/TRAINING PROGRAM

## 2025-06-26 PROCEDURE — 99024 POSTOP FOLLOW-UP VISIT: CPT | Performed by: STUDENT IN AN ORGANIZED HEALTH CARE EDUCATION/TRAINING PROGRAM

## 2025-06-26 PROCEDURE — 80048 BASIC METABOLIC PNL TOTAL CA: CPT | Performed by: STUDENT IN AN ORGANIZED HEALTH CARE EDUCATION/TRAINING PROGRAM

## 2025-06-26 PROCEDURE — NC001 PR NO CHARGE: Performed by: STUDENT IN AN ORGANIZED HEALTH CARE EDUCATION/TRAINING PROGRAM

## 2025-06-26 RX ORDER — PROMETHAZINE HYDROCHLORIDE 25 MG/ML
25 INJECTION, SOLUTION INTRAMUSCULAR; INTRAVENOUS EVERY 6 HOURS PRN
Status: DISCONTINUED | OUTPATIENT
Start: 2025-06-26 | End: 2025-06-26 | Stop reason: HOSPADM

## 2025-06-26 RX ADMIN — METRONIDAZOLE 500 MG: 500 INJECTION, SOLUTION INTRAVENOUS at 05:35

## 2025-06-26 RX ADMIN — PANTOPRAZOLE SODIUM 40 MG: 40 INJECTION, POWDER, FOR SOLUTION INTRAVENOUS at 08:28

## 2025-06-26 RX ADMIN — CEFAZOLIN SODIUM 3000 MG: 3 SOLUTION INTRAVENOUS at 05:35

## 2025-06-26 RX ADMIN — SODIUM CHLORIDE, SODIUM LACTATE, POTASSIUM CHLORIDE, AND CALCIUM CHLORIDE 100 ML/HR: .6; .31; .03; .02 INJECTION, SOLUTION INTRAVENOUS at 07:18

## 2025-06-26 RX ADMIN — SIMETHICONE 80 MG: 80 TABLET, CHEWABLE ORAL at 12:59

## 2025-06-26 RX ADMIN — ACETAMINOPHEN 1000 MG: 10 INJECTION INTRAVENOUS at 05:35

## 2025-06-26 NOTE — PROGRESS NOTES
"Progress Note - Bariatric Surgery   Vimal Solorio 25 y.o. male MRN: [id number]  Unit/Bed#: 2 [place] [address] Encounter: [id number]      Subjective/Objective     Subjective:  Patient with morbid obesity POD1 s/p laparoscopic RNYGB.  Patient denies fevers, chills, sweats, SOB, CP, calf pain.  Pain adequately controlled on oral pain medication.  Ambulating without assistance, voiding well, and using incentive spirometer.  Patient tolerating liquid diet without nausea or vomiting today.  Vital signs stable.  CBC today shows appropriate Hgb and WBC counts.  BMP obtained today appropriate.  Using CPAP.          Objective:    /96   Pulse 83   Temp 98.5 °F (36.9 °C)   Resp 16   Ht 6' 3\" (1.905 m)   Wt (!) 197 kg (433 lb 13.8 oz)   SpO2 95%   BMI 54.23 kg/m²       Intake/Output Summary (Last 24 hours) at 6/26/2025 0751  Last data filed at 6/26/2025 0718  Gross per 24 hour   Intake 2698.33 ml   Output 1075 ml   Net 1623.33 ml       Invasive Devices       Peripheral Intravenous Line  Duration             Peripheral IV 06/25/25 Distal;Left Forearm <1 day    Peripheral IV 06/25/25 Distal;Right;Ventral (anterior) Forearm <1 day                    ROS: 10-point system completed. All negative except see HPI.    Physical Exam    General Appearance:    Alert, cooperative, no distress, appears stated age   Head:    Normocephalic, without obvious abnormality, atraumatic   Lungs:     Respirations unlabored   Heart:    Regular rate and rhythm   Abdomen:     Soft, appropriate tenderness, no masses, no organomegaly, non-distended   Extremities:   Extremities normal, atraumatic, no cyanosis or edema   Neurologic:  Incision:  Psych:   Normal strength and sensation    Clean, dry, and intact, no evidence of bleeding    Normal mood and affect       Lab, Imaging and other studies:I have personally reviewed pertinent lab results.  , CBC:   Lab Results   Component Value Date    WBC 12.87 (H) 06/26/2025    HGB 14.8 06/26/2025    HCT 44.8 " 06/26/2025    MCV 82 06/26/2025     06/26/2025    RBC 5.50 06/26/2025    MCH 26.9 06/26/2025    MCHC 33.0 06/26/2025    RDW 13.5 06/26/2025    MPV 9.6 06/26/2025   , CMP:   Lab Results   Component Value Date    SODIUM 136 06/26/2025    K 4.0 06/26/2025     06/26/2025    CO2 21 06/26/2025    BUN 5 06/26/2025    CREATININE 0.85 06/26/2025    CALCIUM 9.5 06/26/2025    EGFR 122 06/26/2025        VTE Mechanical Prophylaxis: sequential compression device    Assessment/Plan  1)  Patient with morbid Obesity s/p laparoscopic RNYGB with stable post op course.  Patient afebrile and hemodynamically stable.     - Encourage PO fluids   - Recommend ambulation, use of SCDs when not ambulating, and incentive spirometry  -  No lovenox per VTE calculation of 0.505%, discussed with Dr. Nava  - Plan to D/C patient home today pending anticipated progression    Plan of care was discussed with patient.  Care plan discussed with Dr. Brandy Judd MD  Bariatric Surgery  6/26/2025  7:51 AM

## 2025-06-26 NOTE — DISCHARGE INSTR - AVS FIRST PAGE
Bariatric/Weight Loss Surgery  Hospital Discharge Instructions  ACTIVITY:  Progress as feels comfortable - a good rule is:  if you are doing something and it begins to hurt, stop doing the activity. Walk every hour while at home.  You may walk stairs if you do so slowly  You may shower 48 hours after surgery.  Do not scrub incision sites.  Blot gently with clean towel to dry incisions. (see #4 below)   Use your incentive spirometer 10 times per hour while awake for 1 week after surgery.  Do NOT drive for 48 hours after surgery. No driving 24 hours after taking certain prescription pain medications. Examples of such medication are Percocet, Darvocet, Oxycodone, Tylenol #3, and Tylenol with Codeine.     DIET  Bariatric patients: Stay on a liquid diet for 7 days after your surgery date, sipping slowly. Refer to your manual for examples of choices. Remember to keep your liquids sugar free or low calorie. You may have protein drinks. Make sure to drink 48 to 64 ounces per day of fluids. You may advance to a pureed diet one week after surgery as instructed by your diet progression pamphlet. Once you get approval from your surgeon at your first post operative visit, you may advance to the soft diet and remain on soft diet for 8 weeks unless otherwise instructed.  Hernia patients: Hernia diet as instructed    MEDICATIONS:  The abdominal nerve block will wear off during the first 1-2 days that you are home, and you may become sore (especially over incision site/sites where abdominal wall is sutured). This may create a pulling sensation, especially while moving around, and will fade over time.  Continue to take your Tylenol and your pain medication as instructed.   Bariatric patients: Start vitamins and minerals one week after surgery or when you start stage 3/puree diet.   Anti-acid Medication as per prescription.  Other medications as indicated on the Physician Patient Discharge Instructions form given to you at the time of  discharge.  You will need to consult with your Family Doctor in regards to all your prescribed medication, particularly those for blood pressure and diabetes.  As you lose weight, medical conditions may change, requiring an alteration or elimination of the drug dose. Monitor blood pressure closely and call PCP with any concerns.   Lovenox injections daily ONLY if indicated   Females: DO NOT TAKE BIRTH CONTROL(BC) MEDICATIONS, INSERT BC VAGINAL RINGS, OR PLACE IUD OR ANY OTHER BC METHODS UNTIL 31 DAYS FROM DAY OF DISCHARGE FROM HOSPITAL. THIS PLACES YOU AT HIGH RISK FOR A POTENTIALLY LIFE THREATENING BLOOD CLOT. Remember to always use barrier methods for birth control and speak to your GYN about using two forms of birth control to start 31 days after surgery. It is very important to avoid pregnancy until at least 18-24 months after surgery.     INCISION CARE  You may shower and get incisions wet 2 days after surgery. No soaking tub baths or swimming for 30 days after surgery. Keep abdominal area and incisions clean. Use soap and water to create a good lather and rinse off.  Do not scrub incisions.   If you have a drain, empty the drain as the nurses instructed.    FOLLOW-UP APPOINTMENT should be made for one week after discharge. Call surgeon’s office at [phone] to schedule an appointment.    CALL YOUR DOCTOR FOR:  pain not controlled by pain medications, a temperature greater than 101.5° F, any increase or change in drainage or redness from any incision, any vomiting or inability to keep liquids down, shortness of breath, shoulder pain, or bleeding

## 2025-06-26 NOTE — DISCHARGE SUMMARY
Discharge Summary - Vimal Solorio 25 y.o. male MRN: [id number]    Unit/Bed#: [address] [place] [address] Encounter: [id number]      Pre-Operative Diagnosis: Pre-Op Diagnosis Codes:      * Morbid obesity (HCC) [E66.01]    Post-Operative Diagnosis: Post-Op Diagnosis Codes:     * Morbid obesity (HCC) [E66.01]    Procedures Performed:  Procedure(s):  BYPASS GASTRIC  TINA-EN-Y LAPAROSCOPIC AND INTRAOPERATIVE EGD    Surgeon: Hu Nava MD    See H & P for full details of admission and Operative Note for full details of operations performed.     Patient tolerated surgery well without complications. In the morning postoperative Day 1, the patient had mild nausea and abdominal pain. Tolerated a clear liquid diet without vomiting. Able to ambulate and voiding independently. Patient was deemed ready for discharge home.     Patient was seen and examined prior to discharge.      Provisions for Follow-Up Care:  See After Visit Summary/Discharge Instructions for information related to follow-up care and home orders.      Disposition: Home, in stable condition.     Planned Readmission: No    Discharge Medications:  See After Visit Summary/Discharge Instructions for reconciled discharge medications provided to patient and family.      Post Operative instructions: Reviewed with patient and/or family.    Signature:   Tang Judd MD  Date: 6/26/2025 Time: 7:50 AM

## 2025-06-27 ENCOUNTER — TELEPHONE (OUTPATIENT)
Dept: BARIATRICS | Facility: CLINIC | Age: 25
End: 2025-06-27

## 2025-06-27 NOTE — ANESTHESIA POSTPROCEDURE EVALUATION
Post-Op Assessment Note    CV Status:  Stable    Pain management: adequate       Mental Status:  Awake and sleepy   Hydration Status:  Euvolemic   PONV Controlled:  Controlled   Airway Patency:  Patent     Post Op Vitals Reviewed: Yes    No anethesia notable event occurred.            Last Filed PACU Vitals:  Vitals Value Taken Time   Temp 98 °F (36.7 °C) 06/25/25 18:16   Pulse 85 06/25/25 17:45   /89 06/25/25 18:16   Resp 18 06/25/25 17:45   SpO2 94 % 06/25/25 17:30       Modified Dash:     Vitals Value Taken Time   Activity 2 06/25/25 17:30   Respiration 2 06/25/25 17:30   Circulation 2 06/25/25 17:30   Consciousness 2 06/25/25 17:30   Oxygen Saturation 2 06/25/25 17:30     Modified Dash Score: 10

## 2025-07-02 ENCOUNTER — CLINICAL SUPPORT (OUTPATIENT)
Dept: BARIATRICS | Facility: CLINIC | Age: 25
End: 2025-07-02

## 2025-07-02 ENCOUNTER — OFFICE VISIT (OUTPATIENT)
Dept: BARIATRICS | Facility: CLINIC | Age: 25
End: 2025-07-02

## 2025-07-02 VITALS
OXYGEN SATURATION: 97 % | SYSTOLIC BLOOD PRESSURE: 140 MMHG | DIASTOLIC BLOOD PRESSURE: 86 MMHG | BODY MASS INDEX: 38.36 KG/M2 | WEIGHT: 315 LBS | TEMPERATURE: 96.3 F | HEART RATE: 114 BPM | HEIGHT: 76 IN

## 2025-07-02 VITALS — WEIGHT: 315 LBS | BODY MASS INDEX: 38.36 KG/M2 | HEIGHT: 76 IN

## 2025-07-02 DIAGNOSIS — E66.01 MORBID OBESITY WITH BMI OF 50.0-59.9, ADULT (HCC): ICD-10-CM

## 2025-07-02 DIAGNOSIS — K91.2 POSTSURGICAL MALABSORPTION: Primary | ICD-10-CM

## 2025-07-02 DIAGNOSIS — Z48.815 ENCOUNTER FOR SURGICAL AFTERCARE FOLLOWING SURGERY OF DIGESTIVE SYSTEM: Primary | ICD-10-CM

## 2025-07-02 DIAGNOSIS — G47.33 OSA (OBSTRUCTIVE SLEEP APNEA): ICD-10-CM

## 2025-07-02 DIAGNOSIS — K91.2 POSTSURGICAL MALABSORPTION: ICD-10-CM

## 2025-07-02 PROCEDURE — RECHECK

## 2025-07-02 PROCEDURE — 99024 POSTOP FOLLOW-UP VISIT: CPT | Performed by: PHYSICIAN ASSISTANT

## 2025-07-02 NOTE — PROGRESS NOTES
Weight Management Nutrition Class     Diagnosis: Morbid Obesity    Bariatric Surgeon: Dr. Hu Nava    Surgery: Gastric Bypass Laparoscopic    Class: first post op note    Topics discussed today include:     fluid goals post op, protein goals post op, constipation, chew food well, exercise, avoidance of alcohol, PPI use, diet progression, hypoglycemia, dumping syndrome, protein supplems, vitamin/mineral supplements, and calcium supplements    Patient was able to verbalize basic diet (protein, fluid, vitamin and mineral) recommendations and possible nutrition-related complications. Yes     Still on full liquids:  48oz Water, 2 11oz muscle milk, 32oz gatorade zero  Advised can transition to pureed foods today and soft foods on July 9th. Pt verbalizes understanding.

## 2025-07-02 NOTE — ASSESSMENT & PLAN NOTE
-At risk for malabsorption of vitamins/minerals secondary to malabsorption and restriction of intake from bariatric surgery  -Currently taking adequate postop bariatric surgery vitamin supplementation: Michael MVI and calcium citrate 500mg TID    -Obtain labs in 3 months  -Patient received education about the importance of adhering to a lifelong supplementation regimen to avoid vitamin/mineral deficiencies

## 2025-07-02 NOTE — PROGRESS NOTES
"FIRST POST-OPERATIVE VISIT - BARIATRIC SURGERY  Vimal Solorio 25 y.o. male MRN: [id number]  Unit/Bed#:  Encounter: [id number]      HPI:  Vimal Solorio is a 25 y.o. male who presents for the 1st postoperative visit following a laparoscopic Naveen-en-Y gastric bypass with Dr. Nava on 06/25/25. Tolerating adequate PO intake, ambulating regularly, using IS, voiding, +F/BM. Denies n/v/cp/sob/dizziness    Amount of Oxycodone 5 mg tablets taken/given - (0 tablets; pain score - 0/10)        Review of Systems   Constitutional:  Negative for chills and fever. Unexpected weight change: planned weight loss.  HENT:  Negative for trouble swallowing.    Respiratory:  Negative for cough and shortness of breath.    Cardiovascular:  Negative for chest pain and palpitations.   Gastrointestinal:  Negative for abdominal pain, constipation, diarrhea, nausea and vomiting.   Skin:  Positive for wound.   Neurological:  Negative for dizziness.   Psychiatric/Behavioral:          Denies anxiety and depression       Historical Information   Past Medical History[1]  Past Surgical History[2]  Social History   Social History     Substance and Sexual Activity   Alcohol Use Never     Social History     Substance and Sexual Activity   Drug Use Never     Tobacco Use History[3]  Family History: Family history non-contributory    Meds/Allergies   all medications and allergies reviewed  Allergies[4]    Objective     Current Vitals:   Blood Pressure: 140/86 (07/02/25 0948)  Pulse: (!) 114 (07/02/25 0948)  Temperature: (!) 96.3 °F (35.7 °C) (07/02/25 0948)  Height: 6' 4\" (193 cm) (07/02/25 0948)  Weight - Scale: (!) 188 kg (414 lb 12.8 oz) (07/02/25 0948)  SpO2: 97 % (07/02/25 0948)     Physical Exam  Vitals reviewed.   Constitutional:       General: He is not in acute distress.     Appearance: He is well-developed.   HENT:      Head: Normocephalic and atraumatic.     Eyes:      General: No scleral icterus.      Cardiovascular:      Rate and Rhythm: Normal rate " and regular rhythm.      Heart sounds: Normal heart sounds.   Pulmonary:      Effort: Pulmonary effort is normal. No respiratory distress.      Breath sounds: Normal breath sounds.   Abdominal:      General: Bowel sounds are normal. There is no distension.      Palpations: Abdomen is soft.      Tenderness: There is no abdominal tenderness.      Comments: Incisions C/D/I, no signs of infection     Skin:     General: Skin is warm and dry.     Neurological:      Mental Status: He is alert.     Psychiatric:         Mood and Affect: Mood normal.         Behavior: Behavior normal.           Assessment/Plan :    Patient is presenting for the first postoperative visit, patient hospital stay was uneventful without any complications, patient is doing well, has no complaints, is taking vitamins as instructed, currently tolerating the blenderized diet, will advance to soft diet.     Patient will also be meeting with our dietician today to review her vitamin and mineral supplements and also go over his diet and emphasize postoperative commitment and compliance. The patient was also instructed to start exercising on a regular basis. However, I recommended no heavy lifting, or weight exercises for another 2 weeks. F/U at the 5 week global class and 3 month appointment. Labs for the 3 month appointment were ordered. Patient was instructed to call if develops nausea, vomiting, fever or chills.          [1]   Past Medical History:  Diagnosis Date    CPAP (continuous positive airway pressure) dependence     No pertinent past medical history     Sleep apnea    [2]   Past Surgical History:  Procedure Laterality Date    EGD      DE LAPS GSTR RSTCV PX W/BYP TINA-EN-Y LIMB <150 CM N/A 6/25/2025    Procedure: BYPASS GASTRIC  TINA-EN-Y LAPAROSCOPIC AND INTRAOPERATIVE EGD;  Surgeon: Hu Nava MD;  Location: [facility];  Service: Bariatrics    WISDOM TOOTH EXTRACTION Bilateral     [id number]   [3]   Social History  Tobacco Use   Smoking  Status Never    Passive exposure: Never   Smokeless Tobacco Never   [4]   Allergies  Allergen Reactions    Aspirin Other (See Comments)     Gastric Bypass

## 2025-07-08 ENCOUNTER — OFFICE VISIT (OUTPATIENT)
Dept: FAMILY MEDICINE CLINIC | Facility: CLINIC | Age: 25
End: 2025-07-08
Payer: COMMERCIAL

## 2025-07-08 VITALS
HEIGHT: 76 IN | DIASTOLIC BLOOD PRESSURE: 90 MMHG | RESPIRATION RATE: 17 BRPM | HEART RATE: 64 BPM | WEIGHT: 315 LBS | TEMPERATURE: 97 F | BODY MASS INDEX: 38.36 KG/M2 | SYSTOLIC BLOOD PRESSURE: 154 MMHG

## 2025-07-08 DIAGNOSIS — G47.33 OSA (OBSTRUCTIVE SLEEP APNEA): ICD-10-CM

## 2025-07-08 DIAGNOSIS — Z98.84 S/P GASTRIC BYPASS: Primary | ICD-10-CM

## 2025-07-08 DIAGNOSIS — R03.0 ELEVATED BLOOD PRESSURE READING WITHOUT DIAGNOSIS OF HYPERTENSION: ICD-10-CM

## 2025-07-08 PROCEDURE — 99214 OFFICE O/P EST MOD 30 MIN: CPT | Performed by: NURSE PRACTITIONER

## 2025-07-08 NOTE — PROGRESS NOTES
"Name: Vimal Solorio      : 2000      MRN: [id number]  Encounter Provider: BRENDA Sena  Encounter Date: 2025   Encounter department: [facility]  :  Assessment & Plan  S/P gastric bypass  Doing well and will follow with weight loss and BP elevated and expected to come down with weight loss and will follow back for any concerns       CHIP (obstructive sleep apnea)  Complaint with CPAP       Elevated blood pressure reading without diagnosis of hypertension  Will continue to monitor and will follow back for any concerns              History of Present Illness   Patient is here to follow up after recent gastric bypass surgery.  Stated that doing well and on soft diet  Lost 9 pounds since surgery  Denies any constipation and diarrhea issues        Review of Systems   HENT: Negative.     Respiratory: Negative.     Cardiovascular: Negative.    Gastrointestinal: Negative.    Neurological: Negative.        Objective   /90   Pulse 64   Temp (!) 97 °F (36.1 °C)   Resp 17   Ht 6' 4\" (1.93 m)   Wt (!) 184 kg (405 lb 12.8 oz)   BMI 49.40 kg/m²      Wt Readings from Last 3 Encounters:   25 (!) 184 kg (405 lb 12.8 oz)   25 (!) 188 kg (414 lb 12.8 oz)   25 (!) 188 kg (414 lb 12.8 oz)      Physical Exam  Constitutional:       Appearance: Normal appearance. He is well-developed. He is morbidly obese.       Cardiovascular:      Rate and Rhythm: Normal rate and regular rhythm.      Heart sounds: Normal heart sounds.   Pulmonary:      Effort: Pulmonary effort is normal.      Breath sounds: Normal breath sounds.   Abdominal:      General: Bowel sounds are normal. There is no distension.      Palpations: Abdomen is soft.      Tenderness: There is no abdominal tenderness. There is no rebound.     Skin:     General: Skin is warm and dry.     Neurological:      Mental Status: He is alert and oriented to person, place, and time.     Psychiatric:         Behavior: Behavior normal.         " Thought Content: Thought content normal.         Judgment: Judgment normal.

## 2025-07-08 NOTE — PATIENT INSTRUCTIONS
"Patient Education     Weight loss surgery   The Basics   Written by the doctors and editors at [facility]   What is weight loss surgery? -- Weight loss surgery, sometimes called \"bariatric surgery,\" is surgery to help you lose weight. It works by making you take in fewer calories and nutrients.  Who can have weight loss surgery? -- Doctors use a measure called \"body mass index,\" or BMI, to decide who is underweight, at a healthy weight, or overweight. Your BMI will tell you which category you are in based on your weight and height (figure 1).  Weight loss surgery is appropriate only if you have not been able to lose weight in other ways and if you:   Have a BMI above 35   Have a BMI above 30 and have a medical problem related to obesity, such as diabetes, heart disease, or high blood pressure  For people who are [place]  or [place], weight loss surgery might be an option for people with a slightly lower BMI than the numbers above. That's because people in these groups are more likely to carry weight around their middle, which can raise the risk of health problems.  Are there different types of weight loss surgery? -- Yes. There are many different types. The most common are (figure 2):   Gastric bypass - Gastric bypass is short for \"Naveen-en-Y gastric bypass.\" It is sometimes called \"RYGB.\" For this surgery, the doctor closes off part of the stomach, leaving only a small pouch for food. Then, they connect the stomach pouch to the middle part of the small intestine. This allows food to \"bypass,\" or go around, part of the stomach and small intestine.  There are other surgeries that are similar to gastric bypass in how they cause weight loss.   Gastric sleeve - Gastric sleeve is a surgery that turns the stomach into something that looks like a sleeve. It is also called \"sleeve gastrectomy.\" For this, the doctor removes a large portion of the stomach and leaves a narrow tube.  How is weight loss surgery done? -- In " "the [place], almost all gastric bypass and gastric sleeve procedures are done as \"laparoscopic\" surgery. For laparoscopic surgery, the surgeon makes a small cut (incision) and inserts a narrow tool that has a tiny camera on the end into the belly. This tool is called a \"laparoscope.\" It lets surgeon see inside the belly without opening it up all of the way. Then, they can do the surgery using other tools that fit through small openings in the belly. These tools can be controlled from the outside.  How do the different procedures compare? -- Each type of weight loss surgery is different, and each has different benefits and downsides.   Gastric bypass leads to more weight loss and works the fastest, but it also comes with more risks. It can also cause problems in how your body absorbs nutrients. As a result, it can lead to \"nutritional deficiencies,\" meaning that the body is missing important nutrients. This can sometimes make you sick. If you have gastric bypass, your doctor will monitor your nutrient levels afterward.   Sleeve gastrectomy is less risky because it does not involve rerouting or cutting and reattaching the intestines. It is also less likely to cause problems with how you absorb nutrients.  Which surgery is best for me? -- The decision about which type of surgery to have is important. Discuss your choices with your doctor.  If you have different options, ask the following questions:   About how much weight can I expect to lose with each option?   How long will it take me to lose the weight?   What are the risks of each option for someone like me?   What changes will I need to make to my diet and lifestyle with each option?  Whatever you decide, make sure that your surgeon is very experienced with weight loss surgery. Also, check with the staff at your treatment center or make sure that it is a certified \"Center of Excellence.\" Those centers have a team of nurses and doctors who specialize in taking care of " "patients like you. If you are at all unsure about your decision, you can ask for an opinion from another doctor. Most hospitals that have experience with weight loss surgery offer patient education sessions. In these sessions, you can learn more about weight loss surgery and your options.  What are the benefits of weight loss surgery? -- In addition to helping you lose weight, surgery can help improve or even get rid of certain health problems. These include:   Diabetes   High blood pressure   High cholesterol   Sleep apnea, a disorder that causes you to stop breathing for short amounts of time while you sleep   Fatty liver disease  What are the risks of weight loss surgery? -- It depends on:   What type of weight loss surgery you have   Whether your surgery is open or laparoscopic   Your age and overall health   How experienced your surgeon is  In general, the risks could include:   Bleeding   Infection inside the belly or in the incisions from surgery   Leaks from the incisions on the stomach or intestine   A blockage or tear in the intestines   Problems with the heart or lungs, including blood clots   Gallstones   Nutritional problems   Severe diarrhea   Needing more surgery  As with any kind of surgery, it is possible for these procedures to lead to serious problems or even death. Death following weight loss surgery is very rare. But it's still important to talk to your doctor about all of the possible risks of each type of surgery.  Will I need to change the way I eat after weight loss surgery? -- Yes. Work with a dietitian (food expert) to learn how to change your diet.  You will need to eat healthy foods that \"work with\" your surgery. For example, choose foods high in protein and low in fat and calories. You should also avoid liquid foods that are high in calories, such as ice cream. Being careful about what you eat will make you more likely to keep a healthy weight after surgery.  If you have weight loss " surgery, you will need to avoid certain foods that could make you sick. You will also probably need to take special multivitamins with minerals. That's because weight loss surgery, especially gastric bypass, can make it hard for your body to get all of the nutrition it needs. You must take the vitamins for the rest of your life. Your body will always need them to stay healthy.  All topics are updated as new evidence becomes available and our peer review process is complete.  This topic retrieved from [employer] on: Feb 26, 2024.  Topic 55745 Version 19.0  Release: 32.2.4 - C32.56  © 2024 UpToDate, Inc. and/or its affiliates. All rights reserved.  figure 1: Your body mass index (BMI)     Find your height (in feet and inches) in the top row. Then, find your weight (in pounds) in the first column. Now, find where the column for your height and the row for your weight meet. That is your BMI. For example, if you are 5-feet-9-inches tall and you weigh 260 pounds, your BMI is 38.  Graphic 49533 Version 4.0  figure 2: Weight loss surgery     These drawings show how the most common weight loss surgeries work. Panel A shows a gastric bypass, and panel B shows a gastric sleeve.  Graphic 12243 Version 4.0  Consumer Information Use and Disclaimer   Disclaimer: This generalized information is a limited summary of diagnosis, treatment, and/or medication information. It is not meant to be comprehensive and should be used as a tool to help the user understand and/or assess potential diagnostic and treatment options. It does NOT include all information about conditions, treatments, medications, side effects, or risks that may apply to a specific patient. It is not intended to be medical advice or a substitute for the medical advice, diagnosis, or treatment of a health care provider based on the health care provider's examination and assessment of a patient's specific and unique circumstances. Patients must speak with a health care  provider for complete information about their health, medical questions, and treatment options, including any risks or benefits regarding use of medications. This information does not endorse any treatments or medications as safe, effective, or approved for treating a specific patient. Up[employer]Date, Inc. and its affiliates disclaim any warranty or liability relating to this information or the use thereof.The use of this information is governed by the Terms of Use, available at https://www.[employer].com/en/know/clinical-effectiveness-terms. 2024© UpToDate, Inc. and its affiliates and/or licensors. All rights reserved.  Copyright   © 2024 UpToDate, Inc. and/or its affiliates. All rights reserved.

## 2025-07-25 ENCOUNTER — TELEPHONE (OUTPATIENT)
Age: 25
End: 2025-07-25

## 2025-07-25 NOTE — TELEPHONE ENCOUNTER
Spoke to pt. Regarding r/s the one to one global w/ RD Zena, will call him back to confirm dates w/ RD   No change

## 2025-07-29 ENCOUNTER — TELEPHONE (OUTPATIENT)
Age: 25
End: 2025-07-29

## 2025-07-30 ENCOUNTER — OFFICE VISIT (OUTPATIENT)
Dept: SLEEP CENTER | Facility: CLINIC | Age: 25
End: 2025-07-30
Payer: COMMERCIAL

## 2025-07-30 VITALS
DIASTOLIC BLOOD PRESSURE: 86 MMHG | SYSTOLIC BLOOD PRESSURE: 138 MMHG | BODY MASS INDEX: 38.36 KG/M2 | OXYGEN SATURATION: 96 % | HEIGHT: 76 IN | WEIGHT: 315 LBS | HEART RATE: 88 BPM

## 2025-07-30 DIAGNOSIS — R35.1 NOCTURIA: ICD-10-CM

## 2025-07-30 DIAGNOSIS — G47.33 OSA (OBSTRUCTIVE SLEEP APNEA): Primary | ICD-10-CM

## 2025-07-30 DIAGNOSIS — E66.813 OBESITY, CLASS III, BMI 40-49.9 (MORBID OBESITY): ICD-10-CM

## 2025-07-30 PROCEDURE — 99214 OFFICE O/P EST MOD 30 MIN: CPT | Performed by: STUDENT IN AN ORGANIZED HEALTH CARE EDUCATION/TRAINING PROGRAM

## 2025-07-31 ENCOUNTER — CLINICAL SUPPORT (OUTPATIENT)
Dept: BARIATRICS | Facility: CLINIC | Age: 25
End: 2025-07-31

## 2025-07-31 VITALS — WEIGHT: 315 LBS | HEIGHT: 76 IN | BODY MASS INDEX: 38.36 KG/M2

## 2025-07-31 DIAGNOSIS — K91.2 POSTSURGICAL MALABSORPTION: Primary | ICD-10-CM

## 2025-07-31 PROCEDURE — RECHECK

## (undated) DEVICE — POWER SHELL: Brand: SIGNIA

## (undated) DEVICE — SEALANT FIBRIN VISTASEAL 10ML

## (undated) DEVICE — ANTIBACTERIAL VIOLET BRAIDED (POLYGLACTIN 910), SYNTHETIC ABSORBABLE SURGICAL SUTURE: Brand: COATED VICRYL

## (undated) DEVICE — ARTICULATING RELOAD WITH TRI-STAPLE TECHNOLOGY: Brand: ENDO GIA

## (undated) DEVICE — ENDO STITCH 2-0 VICRYL

## (undated) DEVICE — TROCARS: Brand: KII® BALLOON BLUNT TIP SYSTEM

## (undated) DEVICE — GLOVE SRG BIOGEL 8

## (undated) DEVICE — EXOFIN PRECISION PEN HIGH VISCOSITY TOPICAL SKIN ADHESIVE: Brand: EXOFIN PRECISION PEN, 1G

## (undated) DEVICE — TROCAR: Brand: KII FIOS FIRST ENTRY

## (undated) DEVICE — TISSUE RETRIEVAL SYSTEM: Brand: INZII RETRIEVAL SYSTEM

## (undated) DEVICE — SUTURING DEVICE: Brand: ENDO STITCH

## (undated) DEVICE — WEBRIL 6 IN UNSTERILE

## (undated) DEVICE — KIT, GASTRIC BYPASS: Brand: CARDINAL HEALTH

## (undated) DEVICE — TUBE SET SMOKE EVAC PNEUMOCLEAR HUMIDIFIED

## (undated) DEVICE — VIOLET BRAIDED (POLYGLACTIN 910), SYNTHETIC ABSORBABLE SUTURE: Brand: COATED VICRYL

## (undated) DEVICE — NEPTUNE E-SEP SMOKE EVACUATION PENCIL, COATED, 70MM BLADE, PUSH BUTTON SWITCH: Brand: NEPTUNE E-SEP

## (undated) DEVICE — ARTICULATION RELOAD WITH TRI-STAPLE TECHNOLOGY: Brand: ENDO GIA

## (undated) DEVICE — 34" SINGLE PATIENT USE HOVERMATT: Brand: SINGLE PATIENT USE HOVERMATT

## (undated) DEVICE — LAPROSCOPIC CURVED SPATULA ELECTRODE: Brand: CLEANCOAT

## (undated) DEVICE — LAPAROSCOPIC DUAL RIGID APPLICATOR: Brand: ETHICON

## (undated) DEVICE — CURVED JAW CORDLESS ULTRASONIC DISSECTOR: Brand: SONICISION 7

## (undated) DEVICE — SUT MONOCRYL 4-0 PS-2 27 IN Y426H

## (undated) DEVICE — ENDO STITCH 2-0 SURGIDAC 48 IN

## (undated) DEVICE — CLIP APPLIER: Brand: ENDO CLIP II

## (undated) DEVICE — SHEARS: Brand: ENDO SHEARS

## (undated) DEVICE — CIRCULAR STAPLER WITH DST SERIES TECHNOLOGY: Brand: EEA

## (undated) DEVICE — SPONGE 4 X 4 XRAY 16 PLY STRL LF RFD

## (undated) DEVICE — TRANSORAL CIRCULAR STAPLER ANVIL ADVANCING PROXIMAL GUIDE SUTURE: Brand: EEA ORVIL